# Patient Record
Sex: MALE | Race: BLACK OR AFRICAN AMERICAN | NOT HISPANIC OR LATINO | URBAN - METROPOLITAN AREA
[De-identification: names, ages, dates, MRNs, and addresses within clinical notes are randomized per-mention and may not be internally consistent; named-entity substitution may affect disease eponyms.]

---

## 2017-09-04 ENCOUNTER — INPATIENT (INPATIENT)
Facility: HOSPITAL | Age: 82
LOS: 1 days | Discharge: HOME | End: 2017-09-06
Attending: HOSPITALIST | Admitting: HOSPITALIST
Payer: MEDICARE

## 2017-09-04 VITALS
TEMPERATURE: 98 F | HEART RATE: 71 BPM | DIASTOLIC BLOOD PRESSURE: 77 MMHG | OXYGEN SATURATION: 100 % | SYSTOLIC BLOOD PRESSURE: 161 MMHG | RESPIRATION RATE: 17 BRPM

## 2017-09-04 DIAGNOSIS — Z29.9 ENCOUNTER FOR PROPHYLACTIC MEASURES, UNSPECIFIED: ICD-10-CM

## 2017-09-04 DIAGNOSIS — C79.9 SECONDARY MALIGNANT NEOPLASM OF UNSPECIFIED SITE: ICD-10-CM

## 2017-09-04 DIAGNOSIS — Z90.5 ACQUIRED ABSENCE OF KIDNEY: Chronic | ICD-10-CM

## 2017-09-04 DIAGNOSIS — Q24.5 MALFORMATION OF CORONARY VESSELS: ICD-10-CM

## 2017-09-04 DIAGNOSIS — E11.9 TYPE 2 DIABETES MELLITUS WITHOUT COMPLICATIONS: ICD-10-CM

## 2017-09-04 DIAGNOSIS — I82.409 ACUTE EMBOLISM AND THROMBOSIS OF UNSPECIFIED DEEP VEINS OF UNSPECIFIED LOWER EXTREMITY: ICD-10-CM

## 2017-09-04 DIAGNOSIS — G20 PARKINSON'S DISEASE: ICD-10-CM

## 2017-09-04 DIAGNOSIS — N40.0 BENIGN PROSTATIC HYPERPLASIA WITHOUT LOWER URINARY TRACT SYMPTOMS: ICD-10-CM

## 2017-09-04 DIAGNOSIS — D69.3 IMMUNE THROMBOCYTOPENIC PURPURA: ICD-10-CM

## 2017-09-04 DIAGNOSIS — Z98.890 OTHER SPECIFIED POSTPROCEDURAL STATES: Chronic | ICD-10-CM

## 2017-09-04 DIAGNOSIS — I10 ESSENTIAL (PRIMARY) HYPERTENSION: ICD-10-CM

## 2017-09-04 LAB
ALBUMIN SERPL ELPH-MCNC: 3.9 G/DL — SIGNIFICANT CHANGE UP (ref 3.3–5)
ALP SERPL-CCNC: 100 U/L — SIGNIFICANT CHANGE UP (ref 40–120)
ALT FLD-CCNC: 5 U/L — SIGNIFICANT CHANGE UP (ref 4–41)
AMORPH CRY # UR COMP ASSIST: SIGNIFICANT CHANGE UP (ref 0–0)
APPEARANCE UR: SIGNIFICANT CHANGE UP
AST SERPL-CCNC: 17 U/L — SIGNIFICANT CHANGE UP (ref 4–40)
BACTERIA # UR AUTO: SIGNIFICANT CHANGE UP
BASE EXCESS BLDV CALC-SCNC: 2.4 MMOL/L — SIGNIFICANT CHANGE UP
BASOPHILS # BLD AUTO: 0.05 K/UL — SIGNIFICANT CHANGE UP (ref 0–0.2)
BASOPHILS NFR BLD AUTO: 0.5 % — SIGNIFICANT CHANGE UP (ref 0–2)
BILIRUB SERPL-MCNC: 0.2 MG/DL — SIGNIFICANT CHANGE UP (ref 0.2–1.2)
BILIRUB UR-MCNC: NEGATIVE — SIGNIFICANT CHANGE UP
BLOOD GAS VENOUS - CREATININE: 0.72 MG/DL — SIGNIFICANT CHANGE UP (ref 0.5–1.3)
BLOOD UR QL VISUAL: NEGATIVE — SIGNIFICANT CHANGE UP
BUN SERPL-MCNC: 12 MG/DL — SIGNIFICANT CHANGE UP (ref 7–23)
CALCIUM SERPL-MCNC: 9.7 MG/DL — SIGNIFICANT CHANGE UP (ref 8.4–10.5)
CHLORIDE BLDV-SCNC: 99 MMOL/L — SIGNIFICANT CHANGE UP (ref 96–108)
CHLORIDE SERPL-SCNC: 98 MMOL/L — SIGNIFICANT CHANGE UP (ref 98–107)
CO2 SERPL-SCNC: 23 MMOL/L — SIGNIFICANT CHANGE UP (ref 22–31)
COLOR SPEC: SIGNIFICANT CHANGE UP
CREAT SERPL-MCNC: 0.87 MG/DL — SIGNIFICANT CHANGE UP (ref 0.5–1.3)
EOSINOPHIL # BLD AUTO: 0.02 K/UL — SIGNIFICANT CHANGE UP (ref 0–0.5)
EOSINOPHIL NFR BLD AUTO: 0.2 % — SIGNIFICANT CHANGE UP (ref 0–6)
GAS PNL BLDV: 132 MMOL/L — LOW (ref 136–146)
GLUCOSE BLDV-MCNC: 191 — HIGH (ref 70–99)
GLUCOSE SERPL-MCNC: 191 MG/DL — HIGH (ref 70–99)
GLUCOSE UR-MCNC: NEGATIVE — SIGNIFICANT CHANGE UP
HCO3 BLDV-SCNC: 25 MMOL/L — SIGNIFICANT CHANGE UP (ref 20–27)
HCT VFR BLD CALC: 34.7 % — LOW (ref 39–50)
HCT VFR BLDV CALC: 38.3 % — LOW (ref 39–51)
HGB BLD-MCNC: 12.1 G/DL — LOW (ref 13–17)
HGB BLDV-MCNC: 12.5 G/DL — LOW (ref 13–17)
HYALINE CASTS # UR AUTO: SIGNIFICANT CHANGE UP (ref 0–?)
IMM GRANULOCYTES # BLD AUTO: 0.04 # — SIGNIFICANT CHANGE UP
IMM GRANULOCYTES NFR BLD AUTO: 0.4 % — SIGNIFICANT CHANGE UP (ref 0–1.5)
KETONES UR-MCNC: NEGATIVE — SIGNIFICANT CHANGE UP
LACTATE BLDV-MCNC: 2.7 MMOL/L — HIGH (ref 0.5–2)
LEUKOCYTE ESTERASE UR-ACNC: HIGH
LIDOCAIN IGE QN: 13 U/L — SIGNIFICANT CHANGE UP (ref 7–60)
LYMPHOCYTES # BLD AUTO: 1.12 K/UL — SIGNIFICANT CHANGE UP (ref 1–3.3)
LYMPHOCYTES # BLD AUTO: 11.6 % — LOW (ref 13–44)
MCHC RBC-ENTMCNC: 31.6 PG — SIGNIFICANT CHANGE UP (ref 27–34)
MCHC RBC-ENTMCNC: 34.9 % — SIGNIFICANT CHANGE UP (ref 32–36)
MCV RBC AUTO: 90.6 FL — SIGNIFICANT CHANGE UP (ref 80–100)
MONOCYTES # BLD AUTO: 0.53 K/UL — SIGNIFICANT CHANGE UP (ref 0–0.9)
MONOCYTES NFR BLD AUTO: 5.5 % — SIGNIFICANT CHANGE UP (ref 2–14)
MUCOUS THREADS # UR AUTO: SIGNIFICANT CHANGE UP
NEUTROPHILS # BLD AUTO: 7.89 K/UL — HIGH (ref 1.8–7.4)
NEUTROPHILS NFR BLD AUTO: 81.8 % — HIGH (ref 43–77)
NITRITE UR-MCNC: NEGATIVE — SIGNIFICANT CHANGE UP
NRBC # FLD: 0 — SIGNIFICANT CHANGE UP
PCO2 BLDV: 55 MMHG — HIGH (ref 41–51)
PH BLDV: 7.32 PH — SIGNIFICANT CHANGE UP (ref 7.32–7.43)
PH UR: 8 — SIGNIFICANT CHANGE UP (ref 4.6–8)
PLATELET # BLD AUTO: 102 K/UL — LOW (ref 150–400)
PMV BLD: 14.6 FL — HIGH (ref 7–13)
PO2 BLDV: 26 MMHG — LOW (ref 35–40)
POTASSIUM BLDV-SCNC: 7.5 MMOL/L — CRITICAL HIGH (ref 3.4–4.5)
POTASSIUM SERPL-MCNC: 4.4 MMOL/L — SIGNIFICANT CHANGE UP (ref 3.5–5.3)
POTASSIUM SERPL-SCNC: 4.4 MMOL/L — SIGNIFICANT CHANGE UP (ref 3.5–5.3)
PROT SERPL-MCNC: 7.4 G/DL — SIGNIFICANT CHANGE UP (ref 6–8.3)
PROT UR-MCNC: 20 — SIGNIFICANT CHANGE UP
RBC # BLD: 3.83 M/UL — LOW (ref 4.2–5.8)
RBC # FLD: 13.9 % — SIGNIFICANT CHANGE UP (ref 10.3–14.5)
RBC CASTS # UR COMP ASSIST: HIGH (ref 0–?)
SAO2 % BLDV: 41.1 % — LOW (ref 60–85)
SODIUM SERPL-SCNC: 137 MMOL/L — SIGNIFICANT CHANGE UP (ref 135–145)
SP GR SPEC: 1.02 — SIGNIFICANT CHANGE UP (ref 1–1.03)
SQUAMOUS # UR AUTO: SIGNIFICANT CHANGE UP
UROBILINOGEN FLD QL: NORMAL E.U. — SIGNIFICANT CHANGE UP (ref 0.1–0.2)
WBC # BLD: 9.65 K/UL — SIGNIFICANT CHANGE UP (ref 3.8–10.5)
WBC # FLD AUTO: 9.65 K/UL — SIGNIFICANT CHANGE UP (ref 3.8–10.5)
WBC CLUMPS #/AREA URNS HPF: PRESENT — HIGH (ref 0–?)
WBC UR QL: SIGNIFICANT CHANGE UP (ref 0–?)

## 2017-09-04 PROCEDURE — 74177 CT ABD & PELVIS W/CONTRAST: CPT | Mod: 26

## 2017-09-04 PROCEDURE — 99223 1ST HOSP IP/OBS HIGH 75: CPT

## 2017-09-04 PROCEDURE — 70450 CT HEAD/BRAIN W/O DYE: CPT | Mod: 26

## 2017-09-04 RX ORDER — CARBIDOPA AND LEVODOPA 25; 100 MG/1; MG/1
2 TABLET ORAL
Qty: 0 | Refills: 0 | COMMUNITY

## 2017-09-04 RX ORDER — DEXTROSE 50 % IN WATER 50 %
1 SYRINGE (ML) INTRAVENOUS ONCE
Qty: 0 | Refills: 0 | Status: DISCONTINUED | OUTPATIENT
Start: 2017-09-04 | End: 2017-09-06

## 2017-09-04 RX ORDER — INSULIN LISPRO 100/ML
VIAL (ML) SUBCUTANEOUS
Qty: 0 | Refills: 0 | Status: DISCONTINUED | OUTPATIENT
Start: 2017-09-04 | End: 2017-09-06

## 2017-09-04 RX ORDER — DEXTROSE 50 % IN WATER 50 %
25 SYRINGE (ML) INTRAVENOUS ONCE
Qty: 0 | Refills: 0 | Status: DISCONTINUED | OUTPATIENT
Start: 2017-09-04 | End: 2017-09-06

## 2017-09-04 RX ORDER — AMLODIPINE BESYLATE 2.5 MG/1
5 TABLET ORAL DAILY
Qty: 0 | Refills: 0 | Status: DISCONTINUED | OUTPATIENT
Start: 2017-09-04 | End: 2017-09-06

## 2017-09-04 RX ORDER — TAMSULOSIN HYDROCHLORIDE 0.4 MG/1
0.4 CAPSULE ORAL AT BEDTIME
Qty: 0 | Refills: 0 | Status: DISCONTINUED | OUTPATIENT
Start: 2017-09-04 | End: 2017-09-06

## 2017-09-04 RX ORDER — ACETAMINOPHEN 500 MG
650 TABLET ORAL ONCE
Qty: 0 | Refills: 0 | Status: COMPLETED | OUTPATIENT
Start: 2017-09-04 | End: 2017-09-04

## 2017-09-04 RX ORDER — MEGESTROL ACETATE 40 MG/ML
5 SUSPENSION ORAL
Qty: 0 | Refills: 0 | COMMUNITY

## 2017-09-04 RX ORDER — DEXTROSE 50 % IN WATER 50 %
12.5 SYRINGE (ML) INTRAVENOUS ONCE
Qty: 0 | Refills: 0 | Status: DISCONTINUED | OUTPATIENT
Start: 2017-09-04 | End: 2017-09-06

## 2017-09-04 RX ORDER — AMLODIPINE BESYLATE 2.5 MG/1
1 TABLET ORAL
Qty: 0 | Refills: 0 | COMMUNITY

## 2017-09-04 RX ORDER — HYDRALAZINE HCL 50 MG
25 TABLET ORAL THREE TIMES A DAY
Qty: 0 | Refills: 0 | Status: DISCONTINUED | OUTPATIENT
Start: 2017-09-04 | End: 2017-09-06

## 2017-09-04 RX ORDER — INSULIN LISPRO 100/ML
VIAL (ML) SUBCUTANEOUS AT BEDTIME
Qty: 0 | Refills: 0 | Status: DISCONTINUED | OUTPATIENT
Start: 2017-09-04 | End: 2017-09-06

## 2017-09-04 RX ORDER — ASPIRIN/CALCIUM CARB/MAGNESIUM 324 MG
81 TABLET ORAL DAILY
Qty: 0 | Refills: 0 | Status: DISCONTINUED | OUTPATIENT
Start: 2017-09-04 | End: 2017-09-06

## 2017-09-04 RX ORDER — CARBIDOPA AND LEVODOPA 25; 100 MG/1; MG/1
2 TABLET ORAL THREE TIMES A DAY
Qty: 0 | Refills: 0 | Status: DISCONTINUED | OUTPATIENT
Start: 2017-09-04 | End: 2017-09-06

## 2017-09-04 RX ORDER — SIMVASTATIN 20 MG/1
20 TABLET, FILM COATED ORAL AT BEDTIME
Qty: 0 | Refills: 0 | Status: DISCONTINUED | OUTPATIENT
Start: 2017-09-04 | End: 2017-09-06

## 2017-09-04 RX ORDER — SODIUM CHLORIDE 9 MG/ML
1000 INJECTION, SOLUTION INTRAVENOUS
Qty: 0 | Refills: 0 | Status: DISCONTINUED | OUTPATIENT
Start: 2017-09-04 | End: 2017-09-06

## 2017-09-04 RX ORDER — ACETAMINOPHEN 500 MG
2 TABLET ORAL
Qty: 0 | Refills: 0 | COMMUNITY

## 2017-09-04 RX ORDER — HYDRALAZINE HCL 50 MG
1 TABLET ORAL
Qty: 0 | Refills: 0 | COMMUNITY

## 2017-09-04 RX ORDER — ENOXAPARIN SODIUM 100 MG/ML
40 INJECTION SUBCUTANEOUS EVERY 24 HOURS
Qty: 0 | Refills: 0 | Status: DISCONTINUED | OUTPATIENT
Start: 2017-09-04 | End: 2017-09-06

## 2017-09-04 RX ORDER — PRIMIDONE 250 MG/1
250 TABLET ORAL
Qty: 0 | Refills: 0 | Status: DISCONTINUED | OUTPATIENT
Start: 2017-09-04 | End: 2017-09-05

## 2017-09-04 RX ORDER — TAMSULOSIN HYDROCHLORIDE 0.4 MG/1
1 CAPSULE ORAL
Qty: 0 | Refills: 0 | COMMUNITY

## 2017-09-04 RX ORDER — SODIUM CHLORIDE 9 MG/ML
1000 INJECTION INTRAMUSCULAR; INTRAVENOUS; SUBCUTANEOUS ONCE
Qty: 0 | Refills: 0 | Status: COMPLETED | OUTPATIENT
Start: 2017-09-04 | End: 2017-09-04

## 2017-09-04 RX ORDER — CARBIDOPA AND LEVODOPA 25; 100 MG/1; MG/1
1 TABLET ORAL DAILY
Qty: 0 | Refills: 0 | Status: DISCONTINUED | OUTPATIENT
Start: 2017-09-04 | End: 2017-09-04

## 2017-09-04 RX ORDER — GLUCAGON INJECTION, SOLUTION 0.5 MG/.1ML
1 INJECTION, SOLUTION SUBCUTANEOUS ONCE
Qty: 0 | Refills: 0 | Status: DISCONTINUED | OUTPATIENT
Start: 2017-09-04 | End: 2017-09-06

## 2017-09-04 RX ORDER — CEFTRIAXONE 500 MG/1
1 INJECTION, POWDER, FOR SOLUTION INTRAMUSCULAR; INTRAVENOUS ONCE
Qty: 0 | Refills: 0 | Status: COMPLETED | OUTPATIENT
Start: 2017-09-04 | End: 2017-09-04

## 2017-09-04 RX ORDER — SIMVASTATIN 20 MG/1
1 TABLET, FILM COATED ORAL
Qty: 0 | Refills: 0 | COMMUNITY

## 2017-09-04 RX ADMIN — Medication 25 MILLIGRAM(S): at 22:43

## 2017-09-04 RX ADMIN — Medication 650 MILLIGRAM(S): at 10:45

## 2017-09-04 RX ADMIN — ENOXAPARIN SODIUM 40 MILLIGRAM(S): 100 INJECTION SUBCUTANEOUS at 22:46

## 2017-09-04 RX ADMIN — CEFTRIAXONE 100 GRAM(S): 500 INJECTION, POWDER, FOR SOLUTION INTRAMUSCULAR; INTRAVENOUS at 14:22

## 2017-09-04 RX ADMIN — TAMSULOSIN HYDROCHLORIDE 0.4 MILLIGRAM(S): 0.4 CAPSULE ORAL at 22:43

## 2017-09-04 RX ADMIN — Medication 650 MILLIGRAM(S): at 09:15

## 2017-09-04 RX ADMIN — SODIUM CHLORIDE 1000 MILLILITER(S): 9 INJECTION INTRAMUSCULAR; INTRAVENOUS; SUBCUTANEOUS at 09:15

## 2017-09-04 RX ADMIN — SIMVASTATIN 20 MILLIGRAM(S): 20 TABLET, FILM COATED ORAL at 22:43

## 2017-09-04 RX ADMIN — CARBIDOPA AND LEVODOPA 2 TABLET(S): 25; 100 TABLET ORAL at 22:43

## 2017-09-04 NOTE — ED ADULT TRIAGE NOTE - CHIEF COMPLAINT QUOTE
Pt arrives to ED via EMs c/o lower right quadrant pain radiating to his back.  Pt has kidney cancer.  Pt unable to say when last chemo treatment was.  Spouse to arrive with more information. Pt arrives to ED via EMS c/o lower right quadrant pain radiating to his back.  Pain present for last 4 days.  Pt has kidney cancer.  Pt unable to say when last chemo treatment was.  Spouse to arrive with more information.

## 2017-09-04 NOTE — ED ADULT NURSE REASSESSMENT NOTE - NS ED NURSE REASSESS COMMENT FT1
pt AO x3, ambulatory, received from ALEJANDRA Luis, denies any pain or discomfort at this time. Cardiac monitor in place/ sinus. Will continue to monitor.

## 2017-09-04 NOTE — H&P ADULT - PROBLEM SELECTOR PLAN 1
- Incidental finding of coronary sinus thrombosis; as per oncologist, he was not aware of any recent findings of thrombus on imaging.    - Given history of DVT/PE and active malignancy, patient is at high VTE risk.  Thrombus is likely also a consequence of having catheterization/embolectomy/manipulation of R heart in past.  Would start anticoagulation at this time with heparin gtt and further discuss with inpatient hematology/vascular cardiology in AM, especially given history of ITP.    - Risks/benefits of anticoagulation were discussed extensively with pt and family and they opt to pursue anticoagulation despite ITP and bleed risk.   - CT head negative for hemorrhage/metastatic disease - Incidental finding of coronary sinus thrombosis; as per oncologist, he was not aware of any recent findings of thrombus on imaging.    - Given history of DVT/PE and active malignancy, patient is at high VTE risk.  Thrombus is likely also a consequence of having catheterization/embolectomy/manipulation of R heart in past.  Will hold anticoagulation at this time and further discuss with inpatient hematology/vascular cardiology in AM, especially given history of ITP.    - Risks/benefits of anticoagulation were discussed extensively with pt and family and they would be willing to pursue anticoagulation despite bleed risk.   - CT head negative for hemorrhage/metastatic disease - Incidental finding of coronary sinus thrombosis; as per oncologist, he was not aware of any recent findings of thrombus on imaging.    - Given history of DVT/PE and active malignancy, patient is at high VTE risk.  Thrombus is likely also a consequence of having catheterization/embolectomy/manipulation of R heart in past.  Will hold anticoagulation at this time and further discuss with inpatient hematology/vascular cardiology in AM, especially given history of ITP.  Pt is currently hemodynamically stable, not hypoxic, no tachycardia, no signs/symptoms to suggest acute PE.   - Risks/benefits of anticoagulation were discussed extensively with pt and family and they would be willing to pursue anticoagulation despite bleed risk.   - CT head negative for hemorrhage/metastatic disease

## 2017-09-04 NOTE — ED ADULT NURSE NOTE - OBJECTIVE STATEMENT
Received patient to room25. Pt is very weak and lethargic. Pt did come in with c/o abdominal pain. Pt has family at bedside and is alert and oriented times three. Pt evaluated by MD. IVL placed to right hand 20 gauge and labs drawn and sent. Tylenol given as ordered and NS infusing. Pt waiting for results, further evaluation, testing and disposition.      ALEJANDRA Mcgrath

## 2017-09-04 NOTE — ED ADULT NURSE NOTE - CHIEF COMPLAINT QUOTE
Pt arrives to ED via EMS c/o lower right quadrant pain radiating to his back.  Pain present for last 4 days.  Pt has kidney cancer.  Pt unable to say when last chemo treatment was.  Spouse to arrive with more information.

## 2017-09-04 NOTE — H&P ADULT - NSHPPHYSICALEXAM_GEN_ALL_CORE
GENERAL: NAD, well-developed  HEAD:  Atraumatic, Normocephalic  EYES: EOMI, PERRLA, conjunctiva and sclera clear  NECK: Supple, No JVD  CHEST/LUNG: Clear to auscultation bilaterally; No wheeze  HEART: Regular rate and rhythm; No murmurs, rubs, or gallops  ABDOMEN: Soft, Nontender, Nondistended; Bowel sounds present  EXTREMITIES:  2+ Peripheral Pulses, No clubbing, cyanosis, or edema  PSYCH: AAOx3  NEUROLOGY: non-focal  SKIN: No rashes or lesions

## 2017-09-04 NOTE — ED PROVIDER NOTE - PROGRESS NOTE DETAILS
Gollogly: Spoke with Dr Bro - pt has RCC with lymph node mets, has been on Votrient x 2 months. Also has hx of ITP. Gollogly: Called for potassium 7.5 on VBG. Cr 0.72. No peaked T waves and narrow QRS on EKG. Likely hemolyzed, no treatment for now, will follow up CMP. Gollogly: Discussed lab/radiology findings and plan to admit. Pt understands and agrees with plan. Pt with hx of blood clots, currently not on AC. Will get CTH prior to starting AC to eval for mets. Paged Dr Swain at [phone] for admission. Pololy: Discussed lab/radiology findings and plan to admit. Pt understands and agrees with plan. Pt with hx of blood clots, currently not on AC. Will get CTH prior to starting AC to eval for mets. Paged Dr Swain at [phone] for admission. Spoke with Dr Bro again - pt not on AC in the past b/c ITP (had platelets of 10,000). Gollogly: Paged Dr Swain a second time.

## 2017-09-04 NOTE — H&P ADULT - HISTORY OF PRESENT ILLNESS
85M hx RCC s/p R nephrectomy 4 years ago, recently found to have recurrence of cancer in RP lymph nodes and started on Votrient 4 weeks ago, HTN, DM2, BPH, ITP, early Parkinson's disease, RLE DVT and PE 1 year ago s/p embolectomy at [place] (now off Coumadin) presents with RLQ pain.  Pain started 3 days ago and described as sharp pains; he received Tylenol in ER and now pain has completely subsided.  On CT A/P, the patient was incidentally found to have a coronary sinus thrombosis for which the patient is being admitted for.  Pt is a poor historian and family was unable to provide much detail regarding his clot history.  The patient did have a procedure at [place] 1 year ago to remove the clot, which was later confirmed with his outpt oncologist to be an embolectomy.  He is also s/p IVF filter placement and oncologist was unaware of a coronary sinus thrombus on recent imaging.  Pt was taken off of Coumadin by oncologist 2/2 ITP.  Currently, the patient denies SOB, chest pain, LE swelling, dizziness or any recent fainting episodes.  He describes feeling a "funny feeling" in his L upper chest 2 weeks ago, which went away on its own.  Denies any bruising or bleeding episodes, no epistaxis/gum bleeding, melena/hematochezia. 85M hx RCC s/p R nephrectomy 4 years ago, recently found to have recurrence of cancer in RP lymph nodes and started on Votrient 4 weeks ago, HTN, DM2, BPH, ITP, early Parkinson's disease, RLE DVT and PE 1 year ago s/p embolectomy at [place] (now off Coumadin) presents with RLQ pain.  Pain started 3 days ago and described as sharp pains; he received Tylenol in ER and now pain has completely subsided.  On CT A/P, the patient was incidentally found to have a coronary sinus thrombosis for which the patient is being admitted for.  Pt is a poor historian and family was unable to provide much detail regarding his clot history.  The patient did have a procedure at [place] 1 year ago to remove the clot, which was later confirmed with his outpt oncologist to be an embolectomy.  He is also s/p IVF filter placement and oncologist was unaware of a coronary sinus thrombus on recent imaging.  Pt was taken off of Coumadin by oncologist 2/2 ITP several months ago.  Currently, the patient denies SOB, chest pain, LE swelling, dizziness or any recent fainting episodes.  He describes feeling a "funny feeling" in his L upper chest 2 weeks ago, which went away on its own.  Denies any bruising or bleeding episodes, no epistaxis/gum bleeding, melena/hematochezia.

## 2017-09-04 NOTE — H&P ADULT - ASSESSMENT
85M hx RCC s/p R nephrectomy 4 years ago, recently found to have recurrence of cancer in RP lymph nodes and started on Votrient 4 weeks ago, HTN, DM2, BPH, ITP on Promacta (as per oncologist, but pt did not provide this on med list), early Parkinson's disease, RLE DVT and PE 1 year ago s/p embolectomy at [place] (now off Coumadin) presents with RLQ pain.  RLQ pain likely referred pain from recurrence of R retroperitoneal mass, which has now resolved, but incidentally found to have a coronary sinus thrombosis of unclear acuity. 85M hx RCC s/p R nephrectomy 4 years ago, recently found to have recurrence of cancer in RP lymph nodes and started on Votrient 4 weeks ago, HTN, DM2, BPH, ITP on Promacta (as per oncologist, but pt did not provide this on med list), early Parkinson's disease, RLE DVT and PE 1 year ago s/p embolectomy at [place] (now off Coumadin) presents with RLQ pain.  RLQ pain likely referred pain from recurrence of R retroperitoneal mass, which has now resolved, but incidentally found to have a coronary sinus thrombosis of unknown acuity/chronicity.

## 2017-09-04 NOTE — H&P ADULT - PMH
BPH (benign prostatic hyperplasia)    DM (diabetes mellitus)    DVT (deep venous thrombosis)    HTN (hypertension)    Other pulmonary embolism without acute cor pulmonale, unspecified chronicity    Parkinson disease    Renal carcinoma

## 2017-09-04 NOTE — ED PROVIDER NOTE - ATTENDING CONTRIBUTION TO CARE
MASSIMO Attending Note - Dr. Metzger  85M h/o renal cancer s/p R nephrectomy on chemo (Votrient 200mg, last dose yesterday), hx HTN, DM, BPH, Parkinson's, p/w gradually worsening  RLQ constant aching pain x 4 days with nausea and vomiting.  PE: pt is alert and oriented, perrl, ent normal, membranes are dry, neck supple. no lymphadenopathy or thyroid enlargement, No JVD.  Chest clear to P&A, Heart- reg rhythm without murmur, rubs or gallops, radial pulses equal bilaterally.  Abd is soft, right lower quadrant-tenderness in the pelvic area., Bowel sounds are active. no mass or organomegaly. : No CVA tenderness. Neuro:  Pt alert and oriented x 3. Perrl    Distal neurosensory is intact. Motor function is 5/5 strength bilaterally.  No focal deficits. Extremities:  No edema.  Skin: warm and dry.  Impression:  Abdominal pain  Plan: labs, CT scan discussion with Dr. Bro.

## 2017-09-04 NOTE — H&P ADULT - NSHPREVIEWOFSYSTEMS_GEN_ALL_CORE
CONSTITUTIONAL: No fever, weight loss, or fatigue  EYES: No eye pain, visual disturbances, or discharge  ENMT:  No difficulty hearing, tinnitus, vertigo; No sinus or throat pain  NECK: No pain or stiffness  RESPIRATORY: No cough, wheezing, chills or hemoptysis; No shortness of breath  CARDIOVASCULAR: No chest pain, palpitations, dizziness, or leg swelling  GASTROINTESTINAL: Had RLQ pain, now resolved. No nausea, vomiting, or hematemesis; +constipation. No melena or hematochezia.  GENITOURINARY: No dysuria, frequency, hematuria, or incontinence  NEUROLOGICAL: No headaches, memory loss, loss of strength, numbness, or tremors  SKIN: No itching, burning, rashes, or lesions   MUSCULOSKELETAL: No joint pain or swelling  PSYCHIATRIC: No depression, anxiety, mood swings, or difficulty sleeping  HEME/LYMPH: No easy bruising, or bleeding gums

## 2017-09-04 NOTE — ED PROVIDER NOTE - OBJECTIVE STATEMENT
85M h/o renal cancer s/p R nephrectomy on chemo (Votrient 200mg, last dose yesterday), hx HTN, DM, BPH, Parkinson's, p/w RLQ pain x 4 days. The pain is waxing/waning, radiates straight through to the back, associated with N/V x3-4 and generalized weakness. No fever, CP, SOB, diarrhea, dysuria, hematuria, or testicular pain. Pt admitted at [facility] in July, had retroperitoneal lymph node bx.  Onc: Adrienne Bro [phone] (cell: [phone]), PMD: Luigi Swain [phone]

## 2017-09-04 NOTE — H&P ADULT - PROBLEM SELECTOR PLAN 2
- Pt did not report medications for his ITP, but oncologist states that he was prescribed Promacta 75mg in past  - Will consult inpatient hematology

## 2017-09-04 NOTE — H&P ADULT - PROBLEM SELECTOR PLAN 3
- Continue with Votrient  - F/U with outpatient oncology for mgmt of metastatic RCC  - Pain control with Tylenol PRN

## 2017-09-04 NOTE — ED PROVIDER NOTE - PMH
BPH (benign prostatic hyperplasia)    DM (diabetes mellitus)    HTN (hypertension)    Parkinson disease    Renal carcinoma

## 2017-09-04 NOTE — H&P ADULT - NSHPLABSRESULTS_GEN_ALL_CORE
LABS:                        12.1   9.65  )-----------( 102      ( 04 Sep 2017 08:30 )             34.7         137  |  98  |  12  ----------------------------<  191<H>  4.4   |  23  |  0.87    Ca    9.7      04 Sep 2017 08:30    TPro  7.4  /  Alb  3.9  /  TBili  0.2  /  DBili  x   /  AST  17  /  ALT  5   /  AlkPhos  100  09-04          Urinalysis Basic - ( 04 Sep 2017 12:00 )    Color: COLORL / Appearance: HAZY / S.019 / pH: 8.0  Gluc: NEGATIVE / Ketone: NEGATIVE  / Bili: NEGATIVE / Urobili: NORMAL E.U.   Blood: NEGATIVE / Protein: 20 / Nitrite: NEGATIVE   Leuk Esterase: LARGE / RBC: 5-10 / WBC 25-50   Sq Epi: OCC / Non Sq Epi: x / Bacteria: MOD        RADIOLOGY & ADDITIONAL TESTS:    Imaging Personally Reviewed:  < from: CT Head No Cont (17 @ 16:49) >    IMPRESSION:  No CT evidence for acute territorial infarct, intracranial hemorrhage or   mass effect. Chronic microvascular ischemic changes.      < end of copied text >    < from: CT Abdomen and Pelvis w/ IV Cont (17 @ 10:37) >    LOWER CHEST: Bibasilar subsegmental atelectasis.Calcified pleural plaque   along the anterior left chest wall. Thrombus noted in the coronary sinus.    LIVER: Within normal limits.  BILE DUCTS: Normal caliber.  GALLBLADDER: Within normal limits.  SPLEEN: Within normal limits.  PANCREAS: Within normal limits.  ADRENALS: Nodular thickening of the bilateral adrenal glands, unchanged   since 10/10/2008.  KIDNEYS/URETERS: Status post right nephrectomy. The left kidney is   uniformly enhancing. No hydronephrosis or nephrolithiasis.    BLADDER: Mild bladder wall thickening.  REPRODUCTIVE ORGANS: The prostate is enlarged, measuring 6.5 cm in   transverse dimension.    BOWEL: No bowel obstruction. Appendix is not visualized. No secondary   signs of appendicitis.   PERITONEUM: No ascites.  VESSELS:A suprarenal IVC filter is in place. Atherosclerotic   calcifications. The portal and bilateral renal veins are patent.  RETROPERITONEUM: There is a heterogeneously enhancing right   retroperitoneal mass, measuring 6.3 x 3.5 cm. The mass abuts the right   side of the aorta, causes anterior displacement of the left renal vein,   and causes lateral displacement and luminal narrowing of the IVC. Mass is   inseparable from the right karen of the diaphragm.  ABDOMINAL WALL: Postsurgical changes of the anterior abdominal wall.  BONES: Multilevel lumbar spondylosis.    IMPRESSION:     Heterogeneously enhancing right retroperitoneal mass abutting and   displacing the left renal vein and IVC without evidence of invasion or   tumor thrombus. Findings are consistent with recurrent neoplasm.    Thrombus in the coronary sinus.    < end of copied text >      Care Discussed with Consultants/Other Providers: Outpatient oncologist

## 2017-09-05 LAB
APTT BLD: 32.3 SEC — SIGNIFICANT CHANGE UP (ref 27.5–37.4)
BASOPHILS # BLD AUTO: 0.07 K/UL — SIGNIFICANT CHANGE UP (ref 0–0.2)
BASOPHILS NFR BLD AUTO: 0.8 % — SIGNIFICANT CHANGE UP (ref 0–2)
BUN SERPL-MCNC: 13 MG/DL — SIGNIFICANT CHANGE UP (ref 7–23)
CALCIUM SERPL-MCNC: 9.1 MG/DL — SIGNIFICANT CHANGE UP (ref 8.4–10.5)
CHLORIDE SERPL-SCNC: 101 MMOL/L — SIGNIFICANT CHANGE UP (ref 98–107)
CO2 SERPL-SCNC: 24 MMOL/L — SIGNIFICANT CHANGE UP (ref 22–31)
CREAT SERPL-MCNC: 0.99 MG/DL — SIGNIFICANT CHANGE UP (ref 0.5–1.3)
EOSINOPHIL # BLD AUTO: 0.04 K/UL — SIGNIFICANT CHANGE UP (ref 0–0.5)
EOSINOPHIL NFR BLD AUTO: 0.4 % — SIGNIFICANT CHANGE UP (ref 0–6)
GLUCOSE SERPL-MCNC: 117 MG/DL — HIGH (ref 70–99)
HBA1C BLD-MCNC: 7.1 % — HIGH (ref 4–5.6)
HCT VFR BLD CALC: 32.4 % — LOW (ref 39–50)
HGB BLD-MCNC: 11.6 G/DL — LOW (ref 13–17)
IMM GRANULOCYTES # BLD AUTO: 0.01 # — SIGNIFICANT CHANGE UP
IMM GRANULOCYTES NFR BLD AUTO: 0.1 % — SIGNIFICANT CHANGE UP (ref 0–1.5)
INR BLD: 1.1 — SIGNIFICANT CHANGE UP (ref 0.88–1.17)
LYMPHOCYTES # BLD AUTO: 1.38 K/UL — SIGNIFICANT CHANGE UP (ref 1–3.3)
LYMPHOCYTES # BLD AUTO: 15 % — SIGNIFICANT CHANGE UP (ref 13–44)
MANUAL SMEAR VERIFICATION: SIGNIFICANT CHANGE UP
MCHC RBC-ENTMCNC: 32.1 PG — SIGNIFICANT CHANGE UP (ref 27–34)
MCHC RBC-ENTMCNC: 35.8 % — SIGNIFICANT CHANGE UP (ref 32–36)
MCV RBC AUTO: 89.8 FL — SIGNIFICANT CHANGE UP (ref 80–100)
MONOCYTES # BLD AUTO: 0.75 K/UL — SIGNIFICANT CHANGE UP (ref 0–0.9)
MONOCYTES NFR BLD AUTO: 8.1 % — SIGNIFICANT CHANGE UP (ref 2–14)
MORPHOLOGY BLD-IMP: NORMAL — SIGNIFICANT CHANGE UP
NEUTROPHILS # BLD AUTO: 6.98 K/UL — SIGNIFICANT CHANGE UP (ref 1.8–7.4)
NEUTROPHILS NFR BLD AUTO: 75.6 % — SIGNIFICANT CHANGE UP (ref 43–77)
NRBC # FLD: 0 — SIGNIFICANT CHANGE UP
PLATELET # BLD AUTO: 49 K/UL — LOW (ref 150–400)
PLATELET COUNT - ESTIMATE: SIGNIFICANT CHANGE UP
PMV BLD: SIGNIFICANT CHANGE UP FL (ref 7–13)
POTASSIUM SERPL-MCNC: 4.2 MMOL/L — SIGNIFICANT CHANGE UP (ref 3.5–5.3)
POTASSIUM SERPL-SCNC: 4.2 MMOL/L — SIGNIFICANT CHANGE UP (ref 3.5–5.3)
PROTHROM AB SERPL-ACNC: 12.4 SEC — SIGNIFICANT CHANGE UP (ref 9.8–13.1)
RBC # BLD: 3.61 M/UL — LOW (ref 4.2–5.8)
RBC # FLD: 14.1 % — SIGNIFICANT CHANGE UP (ref 10.3–14.5)
REVIEW TO FOLLOW: YES — SIGNIFICANT CHANGE UP
SODIUM SERPL-SCNC: 138 MMOL/L — SIGNIFICANT CHANGE UP (ref 135–145)
SPECIMEN SOURCE: SIGNIFICANT CHANGE UP
WBC # BLD: 9.23 K/UL — SIGNIFICANT CHANGE UP (ref 3.8–10.5)
WBC # FLD AUTO: 9.23 K/UL — SIGNIFICANT CHANGE UP (ref 3.8–10.5)

## 2017-09-05 PROCEDURE — 99223 1ST HOSP IP/OBS HIGH 75: CPT | Mod: GC

## 2017-09-05 PROCEDURE — 99233 SBSQ HOSP IP/OBS HIGH 50: CPT

## 2017-09-05 PROCEDURE — 99223 1ST HOSP IP/OBS HIGH 75: CPT

## 2017-09-05 RX ORDER — ACETAMINOPHEN 500 MG
650 TABLET ORAL EVERY 6 HOURS
Qty: 0 | Refills: 0 | Status: DISCONTINUED | OUTPATIENT
Start: 2017-09-05 | End: 2017-09-06

## 2017-09-05 RX ADMIN — SIMVASTATIN 20 MILLIGRAM(S): 20 TABLET, FILM COATED ORAL at 22:53

## 2017-09-05 RX ADMIN — PRIMIDONE 250 MILLIGRAM(S): 250 TABLET ORAL at 05:00

## 2017-09-05 RX ADMIN — CARBIDOPA AND LEVODOPA 2 TABLET(S): 25; 100 TABLET ORAL at 05:00

## 2017-09-05 RX ADMIN — Medication 650 MILLIGRAM(S): at 03:48

## 2017-09-05 RX ADMIN — Medication 25 MILLIGRAM(S): at 22:52

## 2017-09-05 RX ADMIN — Medication 2: at 12:41

## 2017-09-05 RX ADMIN — ENOXAPARIN SODIUM 40 MILLIGRAM(S): 100 INJECTION SUBCUTANEOUS at 22:53

## 2017-09-05 RX ADMIN — Medication 25 MILLIGRAM(S): at 05:00

## 2017-09-05 RX ADMIN — CARBIDOPA AND LEVODOPA 2 TABLET(S): 25; 100 TABLET ORAL at 22:53

## 2017-09-05 RX ADMIN — Medication 81 MILLIGRAM(S): at 12:43

## 2017-09-05 RX ADMIN — Medication 25 MILLIGRAM(S): at 12:43

## 2017-09-05 RX ADMIN — AMLODIPINE BESYLATE 5 MILLIGRAM(S): 2.5 TABLET ORAL at 05:00

## 2017-09-05 RX ADMIN — Medication 650 MILLIGRAM(S): at 23:22

## 2017-09-05 RX ADMIN — CARBIDOPA AND LEVODOPA 2 TABLET(S): 25; 100 TABLET ORAL at 12:42

## 2017-09-05 RX ADMIN — Medication 650 MILLIGRAM(S): at 22:52

## 2017-09-05 RX ADMIN — Medication 650 MILLIGRAM(S): at 04:48

## 2017-09-05 RX ADMIN — TAMSULOSIN HYDROCHLORIDE 0.4 MILLIGRAM(S): 0.4 CAPSULE ORAL at 22:52

## 2017-09-05 NOTE — CONSULT NOTE ADULT - ASSESSMENT
85M hx RCC s/p R nephrectomy 4 years ago, recently found to have recurrence of cancer in retoperitoneal lymph nodes and started on Votrient 4 weeks ago, HTN, DM2, BPH, ITP, early Parkinson's disease, RLE DVT and PE 1 year ago s/p embolectomy at [place] (now off Coumadin) and IVC filter p/w RLQ pain, admitted for an incidental finding of a coronary sinus thrombosis

## 2017-09-05 NOTE — PROGRESS NOTE ADULT - ASSESSMENT
85M hx RCC s/p R nephrectomy 4 years ago, recently found to have recurrence of cancer in RP lymph nodes and started on Votrient 4 weeks ago, HTN, DM2, BPH, ITP on Promacta (as per oncologist, but pt did not provide this on med list), early Parkinson's disease, RLE DVT and PE 1 year ago s/p embolectomy at [place] (now off Coumadin) presents with RLQ pain.  RLQ pain likely referred pain from recurrence of R retroperitoneal mass, which has now resolved, but incidentally found to have a coronary sinus thrombosis of unknown acuity/chronicity.

## 2017-09-05 NOTE — PROGRESS NOTE ADULT - SUBJECTIVE AND OBJECTIVE BOX
Patient is a 85y old  Male who presents with a chief complaint of RLQ pain (04 Sep 2017 18:59)      SUBJECTIVE / OVERNIGHT EVENTS: Had another episode of severe RLQ pain overnight, relieved completely with Tylenol.  Denies fevers/chills, SOB, CP, dysuria, N/V/D.        MEDICATIONS  (STANDING):  primidone 250 milliGRAM(s) Oral two times a day  simvastatin 20 milliGRAM(s) Oral at bedtime  amLODIPine   Tablet 5 milliGRAM(s) Oral daily  hydrALAZINE 25 milliGRAM(s) Oral three times a day  insulin lispro (HumaLOG) corrective regimen sliding scale   SubCutaneous three times a day before meals  insulin lispro (HumaLOG) corrective regimen sliding scale   SubCutaneous at bedtime  dextrose 5%. 1000 milliLiter(s) (50 mL/Hr) IV Continuous <Continuous>  dextrose 50% Injectable 12.5 Gram(s) IV Push once  dextrose 50% Injectable 25 Gram(s) IV Push once  dextrose 50% Injectable 25 Gram(s) IV Push once  tamsulosin 0.4 milliGRAM(s) Oral at bedtime  carbidopa/levodopa  10/100 2 Tablet(s) Oral three times a day  enoxaparin Injectable 40 milliGRAM(s) SubCutaneous every 24 hours  aspirin enteric coated 81 milliGRAM(s) Oral daily  pazopanib 200 milliGRAM(s) Oral two times a day    MEDICATIONS  (PRN):  dextrose Gel 1 Dose(s) Oral once PRN Blood Glucose LESS THAN 70 milliGRAM(s)/deciliter  glucagon  Injectable 1 milliGRAM(s) IntraMuscular once PRN Glucose LESS THAN 70 milligrams/deciliter  acetaminophen   Tablet. 650 milliGRAM(s) Oral every 6 hours PRN Mild Pain (1 - 3)      Vital Signs Last 24 Hrs  T(C): 36.3 (05 Sep 2017 12:33), Max: 37 (04 Sep 2017 20:59)  T(F): 97.4 (05 Sep 2017 12:33), Max: 98.6 (04 Sep 2017 20:59)  HR: 91 (05 Sep 2017 12:33) (62 - 91)  BP: 108/56 (05 Sep 2017 12:33) (108/56 - 182/77)  BP(mean): --  RR: 18 (05 Sep 2017 12:33) (17 - 20)  SpO2: 100% (05 Sep 2017 12:33) (97% - 181%)  CAPILLARY BLOOD GLUCOSE  238 (05 Sep 2017 12:33)  131 (05 Sep 2017 08:49)  166 (04 Sep 2017 20:59)      PHYSICAL EXAM  GENERAL: NAD, well-developed  HEAD:  Atraumatic, Normocephalic  EYES: EOMI, PERRLA, conjunctiva and sclera clear  NECK: Supple, No JVD  CHEST/LUNG: Clear to auscultation bilaterally; No wheeze  HEART: Regular rate and rhythm; No murmurs, rubs, or gallops  ABDOMEN: Soft, Nontender, Nondistended; Bowel sounds present  EXTREMITIES:  2+ Peripheral Pulses, No clubbing, cyanosis, or edema  PSYCH: AAOx3  SKIN: No rashes or lesions    LABS:                        11.6   9.23  )-----------( 49       ( 05 Sep 2017 07:00 )             32.4     09    138  |  101  |  13  ----------------------------<  117<H>  4.2   |  24  |  0.99    Ca    9.1      05 Sep 2017 07:00    TPro  7.4  /  Alb  3.9  /  TBili  0.2  /  DBili  x   /  AST  17  /  ALT  5   /  AlkPhos  100  09-04    PT/INR - ( 05 Sep 2017 07:00 )   PT: 12.4 SEC;   INR: 1.10          PTT - ( 05 Sep 2017 07:00 )  PTT:32.3 SEC      Urinalysis Basic - ( 04 Sep 2017 12:00 )    Color: COLORL / Appearance: HAZY / S.019 / pH: 8.0  Gluc: NEGATIVE / Ketone: NEGATIVE  / Bili: NEGATIVE / Urobili: NORMAL E.U.   Blood: NEGATIVE / Protein: 20 / Nitrite: NEGATIVE   Leuk Esterase: LARGE / RBC: 5-10 / WBC 25-50   Sq Epi: OCC / Non Sq Epi: x / Bacteria: MOD        RADIOLOGY & ADDITIONAL TESTS:    Imaging Personally Reviewed:  < from: CT Head No Cont (17 @ 16:49) >  IMPRESSION:  No CT evidence for acute territorial infarct, intracranial hemorrhage or   mass effect. Chronic microvascular ischemic changes.      < end of copied text >  < from: CT Abdomen and Pelvis w/ IV Cont (17 @ 10:37) >  IMPRESSION:     Heterogeneously enhancing right retroperitoneal mass abutting and   displacing the left renal vein and IVC without evidence of invasion or   tumor thrombus. Findings are consistent with recurrent neoplasm.    Thrombus in the coronary sinus.    < end of copied text >      Care Discussed with Consultants/Other Providers: Hematology, Cardiology

## 2017-09-05 NOTE — CONSULT NOTE ADULT - PROBLEM SELECTOR RECOMMENDATION 3
- History of renal cell carcinoma s/p right nephrectomy   - CT a/p showing a retroperitoneal mass abutting the left renal vein and displacing the IVC   - c/w Votrient; will call his oncologist Dr. Bro tomorrow

## 2017-09-05 NOTE — CONSULT NOTE ADULT - SUBJECTIVE AND OBJECTIVE BOX
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  Pain started 3 days ago and described as sharp pains; he received Tylenol in ER and now pain has completely subsided.  On CT A/P, the patient was incidentally found to have a coronary sinus thrombosis for which the patient is being admitted for.  Pt is a poor historian and family was unable to provide much detail regarding his clot history.  The patient did have a procedure at [place] 1 year ago to remove the clot, which was later confirmed with his outpt oncologist to be an embolectomy.  He is also s/p IVF filter placement and oncologist was unaware of a coronary sinus thrombus on recent imaging.  Pt was taken off of Coumadin by oncologist 2/2 ITP several months ago.  Currently, the patient denies SOB, chest pain, LE swelling, dizziness or any recent fainting episodes.  He describes feeling a "funny feeling" in his L upper chest 2 weeks ago, which went away on its own.  Denies any bruising or bleeding episodes, no epistaxis/gum bleeding, melena/hematochezia. (04 Sep 2017 18:59)          Allergies    No Known Allergies    Intolerances    	    MEDICATIONS:  amLODIPine   Tablet 5 milliGRAM(s) Oral daily  hydrALAZINE 25 milliGRAM(s) Oral three times a day  tamsulosin 0.4 milliGRAM(s) Oral at bedtime  enoxaparin Injectable 40 milliGRAM(s) SubCutaneous every 24 hours  aspirin enteric coated 81 milliGRAM(s) Oral daily        carbidopa/levodopa  10/100 2 Tablet(s) Oral three times a day  acetaminophen   Tablet. 650 milliGRAM(s) Oral every 6 hours PRN      simvastatin 20 milliGRAM(s) Oral at bedtime  insulin lispro (HumaLOG) corrective regimen sliding scale   SubCutaneous three times a day before meals  insulin lispro (HumaLOG) corrective regimen sliding scale   SubCutaneous at bedtime  dextrose Gel 1 Dose(s) Oral once PRN  dextrose 50% Injectable 12.5 Gram(s) IV Push once  dextrose 50% Injectable 25 Gram(s) IV Push once  dextrose 50% Injectable 25 Gram(s) IV Push once  glucagon  Injectable 1 milliGRAM(s) IntraMuscular once PRN    dextrose 5%. 1000 milliLiter(s) IV Continuous <Continuous>  pazopanib 200 milliGRAM(s) Oral two times a day      PAST MEDICAL & SURGICAL HISTORY:  Other pulmonary embolism without acute cor pulmonale, unspecified chronicity  DVT (deep venous thrombosis)  Parkinson disease  Renal carcinoma  BPH (benign prostatic hyperplasia)  DM (diabetes mellitus)  HTN (hypertension)  History of embolectomy  History of nephrectomy, unilateral      FAMILY HISTORY:  No pertinent family history in first degree relatives      SOCIAL HISTORY:    [ ] Non-smoker  [ ] Smoker  [ ] Alcohol    REVIEW OF SYSTEMS:  CONSTITUTIONAL: No fever, weight loss, or fatigue  EYES: No eye pain, visual disturbances, or discharge  ENMT:  No difficulty hearing, tinnitus, vertigo; No sinus or throat pain  NECK: No pain or stiffness  RESPIRATORY: No cough, wheezing, chills or hemoptysis; No Shortness of Breath  CARDIOVASCULAR: No chest pain, palpitations, passing out, dizziness, or leg swelling  GASTROINTESTINAL: No abdominal or epigastric pain. No nausea, vomiting, or hematemesis; No diarrhea or constipation. No melena or hematochezia.  GENITOURINARY: No dysuria, frequency, hematuria, or incontinence  NEUROLOGICAL: No headaches, memory loss, loss of strength, numbness, or tremors  SKIN: No itching, burning, rashes, or lesions   LYMPH Nodes: No enlarged glands  ENDOCRINE: No heat or cold intolerance; No hair loss  MUSCULOSKELETAL: No joint pain or swelling; No muscle, back, or extremity pain  PSYCHIATRIC: No depression, anxiety, mood swings, or difficulty sleeping  HEME/LYMPH: No easy bruising, or bleeding gums  ALLERY AND IMMUNOLOGIC: No hives or eczema	    [ ] All others negative	  [ ] Unable to obtain    PHYSICAL EXAM:  T(C): 36.3 (09-05-17 @ 12:33), Max: 37 (09-04-17 @ 20:59)  HR: 91 (09-05-17 @ 12:33) (63 - 91)  BP: 108/56 (09-05-17 @ 12:33) (108/56 - 180/68)  RR: 18 (09-05-17 @ 12:33) (17 - 20)  SpO2: 100% (09-05-17 @ 12:33) (97% - 100%)  Wt(kg): --  I&O's Summary      Appearance: Normal	  HEENT:   Normal oral mucosa, PERRL, EOMI	  Lymphatic: No lymphadenopathy  Cardiovascular: Normal S1 S2, No JVD, No murmurs, No edema  Respiratory: Lungs clear to auscultation	  Psychiatry: A & O x 3, Mood & affect appropriate  Gastrointestinal:  Soft, Non-tender, + BS	  Skin: No rashes, No ecchymoses, No cyanosis	  Neurologic: Non-focal  Extremities: Normal range of motion, No clubbing, cyanosis or edema  Vascular: Peripheral pulses palpable 2+ bilaterally      LABS:	 	                            11.6   9.23  )-----------( 49       ( 05 Sep 2017 07:00 )             32.4     09-05    138  |  101  |  13  ----------------------------<  117<H>  4.2   |  24  |  0.99  09-04    137  |  98  |  12  ----------------------------<  191<H>  4.4   |  23  |  0.87    Ca    9.1      05 Sep 2017 07:00  Ca    9.7      04 Sep 2017 08:30    TPro  7.4  /  Alb  3.9  /  TBili  0.2  /  DBili  x   /  AST  17  /  ALT  5   /  AlkPhos  100  09-04      proBNP:   Lipid Profile:   HgA1c: Hemoglobin A1C, Whole Blood: 7.1 % (09-05 @ 07:00)    TSH:       CARDIAC MARKERS:            TELEMETRY: 	    ECG:   	  RADIOLOGY:  OTHER: 	      PREVIOUS DIAGNOSTIC CARDIOVASCULAR TESTING:      [ ]  Echocardiogram:  [ ]  Catheterization:  [ ]  Stress test:    [ ]  Vascular studies: Cardiology/Vascular Medicine Inpatient Consultation Note      HISTORY OF PRESENT ILLNESS:  HPI:  85M hx RCC s/p R nephrectomy 4 years ago, recently found to have 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  Pain started 3 days ago and described as sharp pains; he received Tylenol in ER and now pain has completely subsided.  On CT A/P, the patient was incidentally found to have a coronary sinus thrombosis for which the patient is being admitted for.      Pt is a poor historian and family was unable to provide much detail regarding his clot history.  The patient did have a procedure at [place] 1 year ago to remove the clot, which was later confirmed with his outpt oncologist to be an embolectomy.  He is also s/p IVF filter placement and oncologist was unaware of a coronary sinus thrombus on recent imaging.  Pt was taken off of Coumadin by oncologist 2/2 ITP several months ago.  Currently, the patient denies SOB, chest pain, LE swelling, dizziness or any recent fainting episodes.  He describes feeling a "funny feeling" in his L upper chest 2 weeks ago, which went away on its own.  Denies any bruising or bleeding episodes, no epistaxis/gum bleeding, melena/hematochezia. (04 Sep 2017 18:59)    Reviewed CT ab/pel which covered his chest and heart as well.  Noted coronary sinus thrombosis as mentioned above.    Given extensive co-morbid conditions including +ITP with platelet count < 50, advanced age, Parkinson's, and prior treatment for VTE, the bleeding risks associated with anticoagulation at this point likely exceeds benefits.  Case d/w .        Allergies    No Known Allergies    Intolerances    	  MEDICATIONS:  amLODIPine   Tablet 5 milliGRAM(s) Oral daily  hydrALAZINE 25 milliGRAM(s) Oral three times a day  tamsulosin 0.4 milliGRAM(s) Oral at bedtime  enoxaparin Injectable 40 milliGRAM(s) SubCutaneous every 24 hours  aspirin enteric coated 81 milliGRAM(s) Oral daily      carbidopa/levodopa  10/100 2 Tablet(s) Oral three times a day  acetaminophen   Tablet. 650 milliGRAM(s) Oral every 6 hours PRN      simvastatin 20 milliGRAM(s) Oral at bedtime  insulin lispro (HumaLOG) corrective regimen sliding scale   SubCutaneous three times a day before meals  insulin lispro (HumaLOG) corrective regimen sliding scale   SubCutaneous at bedtime  dextrose Gel 1 Dose(s) Oral once PRN  dextrose 50% Injectable 12.5 Gram(s) IV Push once  dextrose 50% Injectable 25 Gram(s) IV Push once  dextrose 50% Injectable 25 Gram(s) IV Push once  glucagon  Injectable 1 milliGRAM(s) IntraMuscular once PRN    dextrose 5%. 1000 milliLiter(s) IV Continuous <Continuous>  pazopanib 200 milliGRAM(s) Oral two times a day      PAST MEDICAL & SURGICAL HISTORY:  Other pulmonary embolism without acute cor pulmonale, unspecified chronicity  DVT (deep venous thrombosis)  Parkinson disease  Renal carcinoma  BPH (benign prostatic hyperplasia)  DM (diabetes mellitus)  HTN (hypertension)  History of embolectomy  History of nephrectomy, unilateral      FAMILY HISTORY:  No pertinent family history in first degree relatives      SOCIAL HISTORY:    NC    REVIEW OF SYSTEMS:  CONSTITUTIONAL: As above  EYES: No eye pain, visual disturbances, or discharge  ENMT:  No difficulty hearing, tinnitus, vertigo; No sinus or throat pain  NECK: No pain or stiffness  RESPIRATORY: No cough, wheezing, chills or hemoptysis; No Shortness of Breath  CARDIOVASCULAR: As above  GASTROINTESTINAL: No abdominal or epigastric pain. No nausea, vomiting, or hematemesis; No diarrhea or constipation. No melena or hematochezia.  GENITOURINARY: No dysuria, frequency, hematuria, or incontinence  NEUROLOGICAL: No headaches, memory loss, loss of strength, numbness, or tremors  SKIN: No itching, burning, rashes, or lesions   LYMPH Nodes: No enlarged glands  ENDOCRINE: No heat or cold intolerance; No hair loss  MUSCULOSKELETAL: No joint pain or swelling; No muscle, back, or extremity pain  PSYCHIATRIC: No depression, anxiety, mood swings, or difficulty sleeping  HEME/LYMPH: No easy bruising, or bleeding gums  ALLERY AND IMMUNOLOGIC: No hives or eczema	    [ ] All others negative	  [ ] Unable to obtain    PHYSICAL EXAM:  T(C): 36.3 (09-05-17 @ 12:33), Max: 37 (09-04-17 @ 20:59)  HR: 91 (09-05-17 @ 12:33) (63 - 91)  BP: 108/56 (09-05-17 @ 12:33) (108/56 - 180/68)  RR: 18 (09-05-17 @ 12:33) (17 - 20)  SpO2: 100% (09-05-17 @ 12:33) (97% - 100%)  Wt(kg): --  I&O's Summary      Appearance: Normal	  HEENT:   Normal oral mucosa, PERRL, EOMI	  Lymphatic: No lymphadenopathy  Cardiovascular: Normal S1 S2, No JVD, No murmurs, No edema  Respiratory: As above  Psychiatry: Awake, alert  Gastrointestinal:  Soft, Non-tender, + BS	  Skin: No rashes, No ecchymoses, No cyanosis	  Neurologic: Non-focal  Extremities: Normal range of motion, No clubbing, cyanosis or edema  Vascular: Peripheral pulses palpable 2+ bilaterally      LABS:	 	                            11.6   9.23  )-----------( 49       ( 05 Sep 2017 07:00 )             32.4     09-05    138  |  101  |  13  ----------------------------<  117<H>  4.2   |  24  |  0.99  09-04    137  |  98  |  12  ----------------------------<  191<H>  4.4   |  23  |  0.87    Ca    9.1      05 Sep 2017 07:00  Ca    9.7      04 Sep 2017 08:30    TPro  7.4  /  Alb  3.9  /  TBili  0.2  /  DBili  x   /  AST  17  /  ALT  5   /  AlkPhos  100  09-04      proBNP:   Lipid Profile:   HgA1c: Hemoglobin A1C, Whole Blood: 7.1 % (09-05 @ 07:00)    TSH:       CARDIAC MARKERS:

## 2017-09-05 NOTE — PROGRESS NOTE ADULT - PROBLEM SELECTOR PLAN 1
- Incidental finding of coronary sinus thrombosis; as per oncologist, he was not aware of any recent findings of thrombus on imaging.    - Given history of DVT/PE and active malignancy, patient is at high VTE risk.  Thrombus is likely also a consequence of having catheterization/embolectomy/manipulation of R heart in past.  Pt is currently hemodynamically stable, not hypoxic, no tachycardia, no signs/symptoms to suggest acute PE.   - Risks/benefits of anticoagulation were discussed extensively with pt and family and they would be willing to pursue anticoagulation despite bleed risk.   - CT head negative for hemorrhage/metastatic disease  - Will further discuss with cardiology and hematology.

## 2017-09-05 NOTE — CONSULT NOTE ADULT - PROBLEM SELECTOR RECOMMENDATION 2
- plt 40 on 2nd day - plt 49 today  - continue to monitor platelet count. Patient is not bleeding at this time  - not on promacta at this time  - would obtain records from outpatient oncologist. Unclear if patient had a DVT/PE on promacta as it can be prothrombotic

## 2017-09-05 NOTE — CONSULT NOTE ADULT - SUBJECTIVE AND OBJECTIVE BOX
Patient is a 85y old  Male who presents with a chief complaint of RLQ pain      HPI:  85M hx RCC s/p R nephrectomy 4 years ago, recently found to have recurrence of cancer in retoperitoneal lymph nodes and started on Votrient 4 weeks ago, HTN, DM2, BPH, ITP, early Parkinson's disease, RLE DVT and PE 1 year ago s/p embolectomy at [place] (now off Coumadin) and IVC filter p/w RLQ pain, admitted for an incidental finding of a coronary sinus thrombosis. His RLQ pain started 3 days ago and described as sharp pains; he received Tylenol in ER and now pain has completely subsided. Pt is a poor historian and most of the information was taken from the chart. Pt was taken off of Coumadin by oncologist 2/2 ITP several months ago. Currently, he denies SOB, chest pain, LE swelling, dizziness.  He describes feeling a "funny feeling" in his L upper chest 2 weeks ago, which went away on its own.  Denies any bruising or bleeding episodes, no epistaxis/gum bleeding, melena/hematochezia.      PMHx/PSHx:   PAST MEDICAL & SURGICAL HISTORY:  Other pulmonary embolism without acute cor pulmonale, unspecified chronicity  DVT (deep venous thrombosis)  Parkinson disease  Renal carcinoma  BPH (benign prostatic hyperplasia)  DM (diabetes mellitus)  HTN (hypertension)  History of embolectomy  History of nephrectomy, unilateral      Social Hx: Lives with his wife. Denies smoking, alcohol or illicit drugs    FHx: non-contributory    Allergies: Allergies    No Known Allergies    Intolerances        Medications Standing   MEDICATIONS  (STANDING):  primidone 250 milliGRAM(s) Oral two times a day  simvastatin 20 milliGRAM(s) Oral at bedtime  amLODIPine   Tablet 5 milliGRAM(s) Oral daily  hydrALAZINE 25 milliGRAM(s) Oral three times a day  insulin lispro (HumaLOG) corrective regimen sliding scale   SubCutaneous three times a day before meals  insulin lispro (HumaLOG) corrective regimen sliding scale   SubCutaneous at bedtime  dextrose 5%. 1000 milliLiter(s) (50 mL/Hr) IV Continuous <Continuous>  dextrose 50% Injectable 12.5 Gram(s) IV Push once  dextrose 50% Injectable 25 Gram(s) IV Push once  dextrose 50% Injectable 25 Gram(s) IV Push once  tamsulosin 0.4 milliGRAM(s) Oral at bedtime  carbidopa/levodopa  10/100 2 Tablet(s) Oral three times a day  enoxaparin Injectable 40 milliGRAM(s) SubCutaneous every 24 hours  aspirin enteric coated 81 milliGRAM(s) Oral daily  pazopanib 200 milliGRAM(s) Oral two times a day      Medications PRN   MEDICATIONS  (PRN):  dextrose Gel 1 Dose(s) Oral once PRN Blood Glucose LESS THAN 70 milliGRAM(s)/deciliter  glucagon  Injectable 1 milliGRAM(s) IntraMuscular once PRN Glucose LESS THAN 70 milligrams/deciliter  acetaminophen   Tablet. 650 milliGRAM(s) Oral every 6 hours PRN Mild Pain (1 - 3)      Physical Exam:     Vital Signs Last 24 Hrs  T(C): 36.6 (05 Sep 2017 04:56), Max: 37 (04 Sep 2017 20:59)  T(F): 97.9 (05 Sep 2017 04:56), Max: 98.6 (04 Sep 2017 20:59)  HR: 81 (05 Sep 2017 04:56) (62 - 81)  BP: 136/74 (05 Sep 2017 04:56) (136/74 - 182/77)  BP(mean): --  RR: 18 (05 Sep 2017 04:56) (17 - 20)  SpO2: 100% (05 Sep 2017 04:56) (97% - 181%)    General: NAD   HEENT: EOMI, PEERL, Nl OP, Nl thyroid gland, No lymphadenopathy, No JVD   CVS: RRR, No murmurs/gallops/regurg  Resp: CTA bilaterally, no rhonchi, rales, wheeze  Abd: Nl BS, soft, NT, ND   Ext: no edema   Skin: no skin lesions or ulcers noted    LABS   CBC                       11.6   9.23  )-----------( 49       ( 05 Sep 2017 07:00 )             32.4     CMP 09-05    138  |  101  |  13  ----------------------------<  117<H>  4.2   |  24  |  0.99    Ca    9.1      05 Sep 2017 07:00    TPro  7.4  /  Alb  3.9  /  TBili  0.2  /  DBili  x   /  AST  17  /  ALT  5   /  AlkPhos  100  09-04    PT/INR - ( 05 Sep 2017 07:00 )   PT: 12.4 SEC;   INR: 1.10          PTT - ( 05 Sep 2017 07:00 )  PTT:32.3 SEC    EKG reviewed: normal sinus with 1st degree AV block and occasional PVCs Patient is a 85y old  Male who presents with a chief complaint of RLQ pain      HPI:  85M hx RCC s/p R nephrectomy 4 years ago, recently found to have 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 His RLQ pain started 3 days ago and described as sharp pains; he received Tylenol in ER and now pain has completely subsided. Pt is a poor historian and most of the information was taken from the chart. Pt was taken off of Coumadin by oncologist 2/2 ITP several months ago. Regarding history of ITP, patient was on Promacta in the past. Currently, he denies SOB, chest pain, LE swelling, dizziness.  He describes feeling a "funny feeling" in his L upper chest 2 weeks ago, which went away on its own.  Denies any bruising or bleeding episodes, no epistaxis/gum bleeding, melena/hematochezia.      PMHx/PSHx:   PAST MEDICAL & SURGICAL HISTORY:  Other pulmonary embolism without acute cor pulmonale, unspecified chronicity  DVT (deep venous thrombosis)  Parkinson disease  Renal carcinoma  BPH (benign prostatic hyperplasia)  DM (diabetes mellitus)  HTN (hypertension)  History of embolectomy  History of nephrectomy, unilateral      Social Hx: Lives with his wife. Denies smoking, alcohol or illicit drugs    FHx: non-contributory    Allergies: Allergies    No Known Allergies    Intolerances        Medications Standing   MEDICATIONS  (STANDING):  primidone 250 milliGRAM(s) Oral two times a day  simvastatin 20 milliGRAM(s) Oral at bedtime  amLODIPine   Tablet 5 milliGRAM(s) Oral daily  hydrALAZINE 25 milliGRAM(s) Oral three times a day  insulin lispro (HumaLOG) corrective regimen sliding scale   SubCutaneous three times a day before meals  insulin lispro (HumaLOG) corrective regimen sliding scale   SubCutaneous at bedtime  dextrose 5%. 1000 milliLiter(s) (50 mL/Hr) IV Continuous <Continuous>  dextrose 50% Injectable 12.5 Gram(s) IV Push once  dextrose 50% Injectable 25 Gram(s) IV Push once  dextrose 50% Injectable 25 Gram(s) IV Push once  tamsulosin 0.4 milliGRAM(s) Oral at bedtime  carbidopa/levodopa  10/100 2 Tablet(s) Oral three times a day  enoxaparin Injectable 40 milliGRAM(s) SubCutaneous every 24 hours  aspirin enteric coated 81 milliGRAM(s) Oral daily  pazopanib 200 milliGRAM(s) Oral two times a day      Medications PRN   MEDICATIONS  (PRN):  dextrose Gel 1 Dose(s) Oral once PRN Blood Glucose LESS THAN 70 milliGRAM(s)/deciliter  glucagon  Injectable 1 milliGRAM(s) IntraMuscular once PRN Glucose LESS THAN 70 milligrams/deciliter  acetaminophen   Tablet. 650 milliGRAM(s) Oral every 6 hours PRN Mild Pain (1 - 3)      Physical Exam:     Vital Signs Last 24 Hrs  T(C): 36.6 (05 Sep 2017 04:56), Max: 37 (04 Sep 2017 20:59)  T(F): 97.9 (05 Sep 2017 04:56), Max: 98.6 (04 Sep 2017 20:59)  HR: 81 (05 Sep 2017 04:56) (62 - 81)  BP: 136/74 (05 Sep 2017 04:56) (136/74 - 182/77)  BP(mean): --  RR: 18 (05 Sep 2017 04:56) (17 - 20)  SpO2: 100% (05 Sep 2017 04:56) (97% - 181%)    General: NAD   HEENT: EOMI, PEERL, Nl OP, Nl thyroid gland, No lymphadenopathy, No JVD   CVS: RRR, No murmurs/gallops/regurg  Resp: CTA bilaterally, no rhonchi, rales, wheeze  Abd: Nl BS, soft, NT, ND   Ext: no edema   Skin: no skin lesions or ulcers noted    LABS   CBC                       11.6   9.23  )-----------( 49       ( 05 Sep 2017 07:00 )             32.4     CMP 09-05    138  |  101  |  13  ----------------------------<  117<H>  4.2   |  24  |  0.99    Ca    9.1      05 Sep 2017 07:00    TPro  7.4  /  Alb  3.9  /  TBili  0.2  /  DBili  x   /  AST  17  /  ALT  5   /  AlkPhos  100  09-04    PT/INR - ( 05 Sep 2017 07:00 )   PT: 12.4 SEC;   INR: 1.10          PTT - ( 05 Sep 2017 07:00 )  PTT:32.3 SEC    EKG reviewed: normal sinus with 1st degree AV block and occasional PVCs

## 2017-09-05 NOTE — CONSULT NOTE ADULT - ASSESSMENT
History of VTE and coronary sinus thrombosis likely 2/2 hypercoagulable state with prior history of cancer/RP mass/active cancer.  Anticoagulation was already previously stopped by PMD 2/2 ITP.  Still with ITP with platelet count < 50.  Given this, along with extensive co-morbid conditions as outlined above, anticoagulation likely poses high risks > benefits.  D/W patient and Medicine team/Dr. Byrd.

## 2017-09-05 NOTE — CONSULT NOTE ADULT - PROBLEM SELECTOR RECOMMENDATION 9
- Patient is asymptomatic at this time; unclear when this actually occurred  - Likely to be chronic at this time as patient may have developed some collaterals around the thrombus. EKG does not show ST-T changes, patient is hemodynamically stable  - Likely 2/2 cardiac procedure in the past vs primary thrombus (2/2 ITP or promacta; ITP and treatment regimen for ITP can be thrombogenic)  - Continue to monitor CE and repeat EKG and continue on telemetry  - Agree with cardiology consult   - This entity can become severe in the acute state - Patient is asymptomatic at this time; unclear when this actually occurred but likely to be chronic at this time as patient may have developed some collaterals around the thrombus. EKG does not show ST-T changes, patient is hemodynamically stable  - Likely 2/2 cardiac procedure in the past vs primary thrombus (2/2 malignancy, ITP or promacta; ITP and treatment regimen for ITP can be pro-thrombotic)  - Consider a repeat EKG, CE and continue on telemetry  - Agree with cardiology consult   - This entity can become life threatening in the acute state. If patient's clinical condition worsens, could consider half dose therapeutic heparin if platelet <50,000. Would need to consider promacta to increase his platelet count due to high risk of bleeding. Steroids and IVIG if platelet count drops less than 30,000. Would consider thrombectomy if patient is severely symptomatic. - Patient is asymptomatic at this time; unclear when this actually occurred but likely to be chronic at this time as patient may have developed some collaterals around the thrombus. EKG does not show ST-T changes, patient is hemodynamically stable  - Likely 2/2 cardiac procedure in the past vs primary thrombus (2/2 malignancy, ITP or promacta; ITP and treatment regimen for ITP can be pro-thrombotic)  - Consider a repeat EKG, CE and continue on telemetry. Consider a TTE. Patient may not be a candidate for a cath given his age, platelet count and malignancy.  - Agree with cardiology consult   - This entity can become life threatening in the acute state. If patient's clinical condition worsens, could consider half dose therapeutic heparin if platelet <50,000. Would need to consider promacta to increase his platelet count due to high risk of bleeding. Steroids and IVIG if platelet count drops less than 30,000. Would consider thrombectomy if patient is severely symptomatic.

## 2017-09-05 NOTE — CONSULT NOTE ADULT - ATTENDING COMMENTS
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 Will need further information from his hematologist/oncologist as pt is a poor historian. Will need to discuss with cardiology whether he needs to be AC'd for this coronary sinus thrombosis as his plt count is <50k at this time. If he does need AC, will need treatment for his ITP in order to anticoagulate safely.

## 2017-09-06 VITALS
RESPIRATION RATE: 18 BRPM | HEART RATE: 72 BPM | TEMPERATURE: 99 F | OXYGEN SATURATION: 100 % | SYSTOLIC BLOOD PRESSURE: 128 MMHG | DIASTOLIC BLOOD PRESSURE: 68 MMHG

## 2017-09-06 LAB
-  AMIKACIN: SIGNIFICANT CHANGE UP
-  AMPICILLIN/SULBACTAM: SIGNIFICANT CHANGE UP
-  AMPICILLIN: SIGNIFICANT CHANGE UP
-  AZTREONAM: SIGNIFICANT CHANGE UP
-  CEFAZOLIN: SIGNIFICANT CHANGE UP
-  CEFEPIME: SIGNIFICANT CHANGE UP
-  CEFOXITIN: SIGNIFICANT CHANGE UP
-  CEFTAZIDIME: SIGNIFICANT CHANGE UP
-  CEFTRIAXONE: SIGNIFICANT CHANGE UP
-  CIPROFLOXACIN: SIGNIFICANT CHANGE UP
-  ERTAPENEM: SIGNIFICANT CHANGE UP
-  GENTAMICIN: SIGNIFICANT CHANGE UP
-  IMIPENEM: SIGNIFICANT CHANGE UP
-  LEVOFLOXACIN: SIGNIFICANT CHANGE UP
-  MEROPENEM: SIGNIFICANT CHANGE UP
-  NITROFURANTOIN: SIGNIFICANT CHANGE UP
-  PIPERACILLIN/TAZOBACTAM: SIGNIFICANT CHANGE UP
-  TIGECYCLINE: SIGNIFICANT CHANGE UP
-  TOBRAMYCIN: SIGNIFICANT CHANGE UP
-  TRIMETHOPRIM/SULFAMETHOXAZOLE: SIGNIFICANT CHANGE UP
APTT BLD: 29.9 SEC — SIGNIFICANT CHANGE UP (ref 27.5–37.4)
APTT BLD: > 200 SEC — CRITICAL HIGH (ref 27.5–37.4)
BACTERIA UR CULT: SIGNIFICANT CHANGE UP
BUN SERPL-MCNC: 18 MG/DL — SIGNIFICANT CHANGE UP (ref 7–23)
CALCIUM SERPL-MCNC: 9.6 MG/DL — SIGNIFICANT CHANGE UP (ref 8.4–10.5)
CHLORIDE SERPL-SCNC: 100 MMOL/L — SIGNIFICANT CHANGE UP (ref 98–107)
CO2 SERPL-SCNC: 21 MMOL/L — LOW (ref 22–31)
CREAT SERPL-MCNC: 1.09 MG/DL — SIGNIFICANT CHANGE UP (ref 0.5–1.3)
GLUCOSE SERPL-MCNC: 120 MG/DL — HIGH (ref 70–99)
HCT VFR BLD CALC: 34.8 % — LOW (ref 39–50)
HGB BLD-MCNC: 11.3 G/DL — LOW (ref 13–17)
INR BLD: 1.1 — SIGNIFICANT CHANGE UP (ref 0.88–1.17)
INR BLD: 2.33 — HIGH (ref 0.88–1.17)
MCHC RBC-ENTMCNC: 31.5 PG — SIGNIFICANT CHANGE UP (ref 27–34)
MCHC RBC-ENTMCNC: 32.5 % — SIGNIFICANT CHANGE UP (ref 32–36)
MCV RBC AUTO: 96.9 FL — SIGNIFICANT CHANGE UP (ref 80–100)
METHOD TYPE: SIGNIFICANT CHANGE UP
NRBC # FLD: 0 — SIGNIFICANT CHANGE UP
ORGANISM # SPEC MICROSCOPIC CNT: SIGNIFICANT CHANGE UP
ORGANISM # SPEC MICROSCOPIC CNT: SIGNIFICANT CHANGE UP
PLATELET # BLD AUTO: 34 K/UL — LOW (ref 150–400)
PMV BLD: SIGNIFICANT CHANGE UP FL (ref 7–13)
POTASSIUM SERPL-MCNC: 4.5 MMOL/L — SIGNIFICANT CHANGE UP (ref 3.5–5.3)
POTASSIUM SERPL-SCNC: 4.5 MMOL/L — SIGNIFICANT CHANGE UP (ref 3.5–5.3)
PROTHROM AB SERPL-ACNC: 12.3 SEC — SIGNIFICANT CHANGE UP (ref 9.8–13.1)
PROTHROM AB SERPL-ACNC: 26.6 SEC — HIGH (ref 9.8–13.1)
RBC # BLD: 3.59 M/UL — LOW (ref 4.2–5.8)
RBC # FLD: 14.4 % — SIGNIFICANT CHANGE UP (ref 10.3–14.5)
SODIUM SERPL-SCNC: 136 MMOL/L — SIGNIFICANT CHANGE UP (ref 135–145)
WBC # BLD: 6.41 K/UL — SIGNIFICANT CHANGE UP (ref 3.8–10.5)
WBC # FLD AUTO: 6.41 K/UL — SIGNIFICANT CHANGE UP (ref 3.8–10.5)

## 2017-09-06 PROCEDURE — 99232 SBSQ HOSP IP/OBS MODERATE 35: CPT

## 2017-09-06 PROCEDURE — 99239 HOSP IP/OBS DSCHRG MGMT >30: CPT

## 2017-09-06 RX ORDER — OXYCODONE HYDROCHLORIDE 5 MG/1
5 TABLET ORAL ONCE
Qty: 0 | Refills: 0 | Status: DISCONTINUED | OUTPATIENT
Start: 2017-09-06 | End: 2017-09-06

## 2017-09-06 RX ORDER — PAZOPANIB HYDROCHLORIDE 200 MG/1
1 TABLET, FILM COATED ORAL
Qty: 0 | Refills: 0 | COMMUNITY
Start: 2017-09-06

## 2017-09-06 RX ORDER — PAZOPANIB HYDROCHLORIDE 200 MG/1
1 TABLET, FILM COATED ORAL
Qty: 0 | Refills: 0 | COMMUNITY

## 2017-09-06 RX ORDER — ASPIRIN/CALCIUM CARB/MAGNESIUM 324 MG
1 TABLET ORAL
Qty: 0 | Refills: 0 | COMMUNITY

## 2017-09-06 RX ORDER — PRIMIDONE 250 MG/1
1 TABLET ORAL
Qty: 0 | Refills: 0 | COMMUNITY

## 2017-09-06 RX ORDER — ASPIRIN/CALCIUM CARB/MAGNESIUM 324 MG
1 TABLET ORAL
Qty: 0 | Refills: 0 | COMMUNITY
Start: 2017-09-06

## 2017-09-06 RX ADMIN — CARBIDOPA AND LEVODOPA 2 TABLET(S): 25; 100 TABLET ORAL at 13:50

## 2017-09-06 RX ADMIN — Medication 25 MILLIGRAM(S): at 13:50

## 2017-09-06 RX ADMIN — OXYCODONE HYDROCHLORIDE 5 MILLIGRAM(S): 5 TABLET ORAL at 06:07

## 2017-09-06 RX ADMIN — AMLODIPINE BESYLATE 5 MILLIGRAM(S): 2.5 TABLET ORAL at 05:24

## 2017-09-06 RX ADMIN — OXYCODONE HYDROCHLORIDE 5 MILLIGRAM(S): 5 TABLET ORAL at 05:37

## 2017-09-06 RX ADMIN — CARBIDOPA AND LEVODOPA 2 TABLET(S): 25; 100 TABLET ORAL at 05:24

## 2017-09-06 RX ADMIN — Medication 81 MILLIGRAM(S): at 13:50

## 2017-09-06 RX ADMIN — Medication 2: at 12:05

## 2017-09-06 RX ADMIN — Medication 25 MILLIGRAM(S): at 05:23

## 2017-09-06 NOTE — DISCHARGE NOTE ADULT - HOSPITAL COURSE
85M hx RCC s/p R nephrectomy 4 years ago, recently found to have recurrence of cancer in RP lymph nodes and started on Votrient 4 weeks ago, HTN, DM2, BPH, ITP on Promacta (as per oncologist, but pt did not provide this on med list), early Parkinson's disease, RLE DVT and PE 1 year ago s/p embolectomy at [place] (now off Coumadin) presents with RLQ pain.  RLQ pain likely referred pain from recurrence of R retroperitoneal mass, which has now resolved, but incidentally found to have a coronary sinus thrombosis of unknown acuity/chronicity  Coronary Sinus Thrombosis   - Vasc Card Dr Swift consulted   - Given extensive co-morbid conditions including +ITP with platelet count < 50, advanced age, the bleeding risks associated with A/C at this point  exceeds benefits.   - CT head -neg   - CT A/P- Heterogeneously enhancing right retroperitoneal mass abutting and displacing the left renal vein and IVC without evidence of invasion or tumor thrombus. Findings are consistent with recurrent neoplasm. Thrombus in the coronary sinus.    ITP   - Promacta in the past   - Heme / Onc consulted  - continue to monitor platelet count. Patient is not bleeding at this time  - not on Promacta at this time  - would obtain records from outpatient oncologist Dr Bro.    Metastatic Cancer   - Continue with Votrient   - f/u Oncology as outpt    DM2   - HgA1C -7.1%    -Sliding scale     HTN   - Continue home meds 85M hx RCC s/p R nephrectomy 4 years ago, recently found to have recurrence of cancer in RP lymph nodes and started on Votrient 4 weeks ago, HTN, DM2, BPH, ITP formerly on Promacta (pt hasn't taken for over a month), early Parkinson's disease, RLE DVT and PE 1 year ago s/p embolectomy at [place] (now off Coumadin) presents with RLQ pain.  RLQ pain likely referred pain from recurrence of R retroperitoneal mass, which has now resolved, but incidentally found to have a coronary sinus thrombosis of unknown acuity/chronicity.  Vascular cardiology and hematology evaluated the patient.  It was deemed that the risks of anticoagulation outweighed the benefits and pt was not initiated on blood thinners.  Pt was advised to follow up with PMD and outpatient oncologist for further management of his malignancy and ITP.  Case was discussed extensively with outpatient oncologist (Dr. Bro) and he was informed that pt will not be started on anticoagulation.  Dr. Bro to continue outpatient chemotherapy and discuss resumption of Promacta as outpatient.  On day of discharge, pt's coagulation studies were abnormal, likely lab error as prior day's coag studies were completely normal.  Pt was medically stable for discharge. 85M hx RCC s/p R nephrectomy 4 years ago, recently found to have recurrence of cancer in RP lymph nodes and started on Votrient 4 weeks ago, HTN, DM2, BPH, ITP formerly on Promacta (pt hasn't taken for over a month), early Parkinson's disease, RLE DVT and PE 1 year ago s/p embolectomy at [place] (now off Coumadin) presents with RLQ pain.  RLQ pain likely referred pain from recurrence of R retroperitoneal mass, which has now resolved, but incidentally found to have a coronary sinus thrombosis of unknown acuity/chronicity.  Vascular cardiology and hematology evaluated the patient.  It was deemed that the risks of anticoagulation outweighed the benefits and pt was not initiated on blood thinners.  Pt was advised to follow up with PMD and outpatient oncologist for further management of his malignancy and ITP.  Case was discussed extensively with outpatient oncologist (Dr. Bro) and he was informed that pt will not be started on anticoagulation.  Dr. Bro to continue outpatient chemotherapy and discuss resumption of Promacta as outpatient.  On day of discharge, pt's coagulation studies were abnormal, likely lab error as prior day's coag studies were completely normal.  Pt was medically stable for discharge.

## 2017-09-06 NOTE — PROGRESS NOTE ADULT - ASSESSMENT
85M hx RCC s/p R nephrectomy 4 years ago, recently found to have recurrence of cancer in RP lymph nodes and started on Votrient 4 weeks ago, HTN, DM2, BPH, ITP on Promacta (as per oncologist, but pt did not provide this on med list), early Parkinson's disease, RLE DVT and PE 1 year ago s/p embolectomy at [place] (now off Coumadin) presents with RLQ pain.  RLQ pain likely referred pain from recurrence of R retroperitoneal mass, which has now resolved, but incidentally found to have a coronary sinus thrombosis of unknown acuity/chronicity.

## 2017-09-06 NOTE — PROGRESS NOTE ADULT - PROBLEM SELECTOR PLAN 2
- Pt did not report medications for his ITP, but oncologist states that he was prescribed Promacta 75mg in past.  As per wife, pt has not been taking this medication recently.  Outpatient heme/onc (Dr. Bro) aware and will f/u next week.   - Will not start Promacta at this time given prothrombotic potential of the medication

## 2017-09-06 NOTE — DISCHARGE NOTE ADULT - PROVIDER TOKENS
TOKEN:'[id number]',FREE:[LAST:[Juarez],FIRST:[Adrienne],PHONE:[(   )    -],FAX:[(   )    -],ADDRESS:[ONCOLOGY]]

## 2017-09-06 NOTE — PROGRESS NOTE ADULT - PROBLEM SELECTOR PLAN 3
- Continue with Votrient  - F/U with outpatient oncology for mgmt of metastatic RCC  - Pain control with Tylenol PRN
- Continue with Votrient.  Pharmacist discovered a major drug interaction with Votrient and Primidone (home med).  Will d/c Primidone.  Dr. Bro has been informed of this interaction and agrees with d/c primidone.   - Pt to f/u with outpatient oncologist (Dr. Bro) early next week   - Pain control with Tylenol PRN

## 2017-09-06 NOTE — DISCHARGE NOTE ADULT - CARE PROVIDER_API CALL
Luigi Swain (MD), Internal Medicine  [id number] [place] [address]  Phone: [phone]  Fax: [phone]    Adrienne Juarez  Phone: (   )    -  Fax: (   )    -

## 2017-09-06 NOTE — DISCHARGE NOTE ADULT - PATIENT PORTAL LINK FT
“You can access the [facility] Patient Portal, offered by [facility], by registering with the following website: [email or web address]”

## 2017-09-06 NOTE — DISCHARGE NOTE ADULT - ADDITIONAL INSTRUCTIONS
Please follow up with oncologist, Dr. Juarez within one week.  Please follow up with cardiologist within one week.  Please follow up with your PMD within one week.

## 2017-09-06 NOTE — DISCHARGE NOTE ADULT - CARE PLAN
Principal Discharge DX:	Coronary sinus abnormality  Goal:	well managed  Instructions for follow-up, activity and diet:	We incidentally found that you have a coronary sinus thrombosis of unknown acuity/chronicity. As per oncologist, he was not aware of any recent findings of thrombus on imaging.  Discussed case with cardiology; will not initiate anticoagulation at this time given multiple comorbidities of active CA and ITP.  Please follow up with Dr. Juarez, the oncologist within 5 days and PMD, Dr. Swain. Call your doctor immediately if you developed any chest pain, fever or increased shortness of breath.  Secondary Diagnosis:	DVT (deep venous thrombosis)  Goal:	stable  Instructions for follow-up, activity and diet:	As per cardiology; will not initiate anticoagulation at this time given multiple comorbidities of active CA and ITP. Please follow up with your PMD within one week. Please call your doctor immediately if you developed any chest pain, shortness of breath or palpitations.  Secondary Diagnosis:	Idiopathic thrombocytopenia  Instructions for follow-up, activity and diet:	Please follow up with Dr. Juarez, the oncologist outpatient within one week. Promacta 75mg on hold because has prothrombotic potential of the medication.  Secondary Diagnosis:	Metastatic cancer  Instructions for follow-up, activity and diet:	Please continue with Votrient as per oncologist, Dr. Juarez. Please follow up with oncologist within one week.  Secondary Diagnosis:	Essential hypertension  Goal:	well controlled  Instructions for follow-up, activity and diet:	Please continue your medications as prescribed, follow up with PMD within one week.  Secondary Diagnosis:	Type 2 diabetes mellitus without complication, without long-term current use of insulin  Goal:	well controlled  Instructions for follow-up, activity and diet:	Please continue your medications as prescribed, follow up with PMD within one week. Principal Discharge DX:	Coronary sinus abnormality  Goal:	well managed  Instructions for follow-up, activity and diet:	We incidentally found that you have a coronary sinus thrombosis of unknown acuity/chronicity. As per oncologist, he was not aware of any recent findings of thrombus on imaging.  Discussed case with cardiology; will not initiate anticoagulation at this time given multiple comorbidities of active CA and ITP.  Please follow up with Dr. Juarez, the oncologist within 5 days and PMD Dr. Swain. Call your doctor immediately if you developed any chest pain, fever or increased shortness of breath.  Secondary Diagnosis:	DVT (deep venous thrombosis)  Goal:	stable  Instructions for follow-up, activity and diet:	As per cardiology; will not initiate anticoagulation at this time given multiple comorbidities of active CA and ITP. Please follow up with your PMD within one week. Please call your doctor immediately if you developed any chest pain, shortness of breath or palpitations.  Secondary Diagnosis:	Idiopathic thrombocytopenia  Instructions for follow-up, activity and diet:	Please follow up with Dr. Juarez, the oncologist outpatient within one week. Promacta 75mg on hold because has prothrombotic potential of the medication.  Secondary Diagnosis:	Metastatic cancer  Instructions for follow-up, activity and diet:	Please continue with Votrient as per oncologist, Dr. Juarez. Please follow up with oncologist within one week.  Secondary Diagnosis:	Essential hypertension  Goal:	well controlled  Instructions for follow-up, activity and diet:	Please continue your medications as prescribed, follow up with PMD within one week.  Secondary Diagnosis:	Type 2 diabetes mellitus without complication, without long-term current use of insulin  Goal:	well controlled  Instructions for follow-up, activity and diet:	Please continue your medications as prescribed, follow up with PMD within one week. Principal Discharge DX:	Coronary sinus abnormality  Goal:	well managed  Instructions for follow-up, activity and diet:	We incidentally found that you have a coronary sinus thrombosis of unknown acuity/chronicity. As per oncologist, he was not aware of any recent findings of thrombus on imaging.  Discussed case with cardiology; will not initiate anticoagulation at this time given multiple comorbidities of active CA and ITP.  Please follow up with Dr. Juarez, the oncologist within 5 days and PMD Dr. Swain. Call your doctor immediately if you developed any chest pain, fever or increased shortness of breath.  Secondary Diagnosis:	DVT (deep venous thrombosis)  Goal:	stable  Instructions for follow-up, activity and diet:	As per cardiology; will not initiate anticoagulation at this time given multiple comorbidities of active CA and ITP. Please follow up with your PMD within one week. Please call your doctor immediately if you developed any chest pain, shortness of breath or palpitations.  Secondary Diagnosis:	Idiopathic thrombocytopenia  Instructions for follow-up, activity and diet:	Please follow up with Dr. Juarez, the oncologist outpatient within one week. Promacta 75mg on hold because has prothrombotic potential of the medication.  Secondary Diagnosis:	Metastatic cancer  Instructions for follow-up, activity and diet:	Please continue with Votrient as per oncologist, Dr. Juarez. Please follow up with oncologist within one week.  Secondary Diagnosis:	Essential hypertension  Goal:	well controlled  Instructions for follow-up, activity and diet:	Please continue your medications as prescribed, follow up with PMD within one week. Monitor blood pressure at home prior to taking medications. Follow low sodium diet.  Secondary Diagnosis:	Type 2 diabetes mellitus without complication, without long-term current use of insulin  Goal:	well controlled  Instructions for follow-up, activity and diet:	Your HgA1C level is 7.1%. Please continue your medications Glipizide as prescribed, follow up with PMD within one week for further management.

## 2017-09-06 NOTE — PROGRESS NOTE ADULT - SUBJECTIVE AND OBJECTIVE BOX
Patient is a 85y old  Male who presents with a chief complaint of RLQ pain (04 Sep 2017 18:59)      SUBJECTIVE / OVERNIGHT EVENTS: Had additional episode of RLQ this morning, resolved with PO oxy 5mg x1.  Currently pain free.         MEDICATIONS  (STANDING):  simvastatin 20 milliGRAM(s) Oral at bedtime  amLODIPine   Tablet 5 milliGRAM(s) Oral daily  hydrALAZINE 25 milliGRAM(s) Oral three times a day  insulin lispro (HumaLOG) corrective regimen sliding scale   SubCutaneous three times a day before meals  insulin lispro (HumaLOG) corrective regimen sliding scale   SubCutaneous at bedtime  dextrose 5%. 1000 milliLiter(s) (50 mL/Hr) IV Continuous <Continuous>  dextrose 50% Injectable 12.5 Gram(s) IV Push once  dextrose 50% Injectable 25 Gram(s) IV Push once  dextrose 50% Injectable 25 Gram(s) IV Push once  tamsulosin 0.4 milliGRAM(s) Oral at bedtime  carbidopa/levodopa  10/100 2 Tablet(s) Oral three times a day  enoxaparin Injectable 40 milliGRAM(s) SubCutaneous every 24 hours  aspirin enteric coated 81 milliGRAM(s) Oral daily  pazopanib 200 milliGRAM(s) Oral two times a day    MEDICATIONS  (PRN):  dextrose Gel 1 Dose(s) Oral once PRN Blood Glucose LESS THAN 70 milliGRAM(s)/deciliter  glucagon  Injectable 1 milliGRAM(s) IntraMuscular once PRN Glucose LESS THAN 70 milligrams/deciliter  acetaminophen   Tablet. 650 milliGRAM(s) Oral every 6 hours PRN Mild Pain (1 - 3)      Vital Signs Last 24 Hrs  T(C): 36.2 (06 Sep 2017 05:18), Max: 37.2 (05 Sep 2017 21:21)  T(F): 97.1 (06 Sep 2017 05:18), Max: 99 (05 Sep 2017 22:33)  HR: 70 (06 Sep 2017 05:18) (66 - 91)  BP: 132/74 (06 Sep 2017 05:18) (108/56 - 142/72)  BP(mean): --  RR: 18 (06 Sep 2017 05:18) (17 - 18)  SpO2: 100% (06 Sep 2017 05:18) (100% - 100%)  CAPILLARY BLOOD GLUCOSE  229 (06 Sep 2017 12:02)  125 (06 Sep 2017 07:58)  165 (05 Sep 2017 21:15)  118 (05 Sep 2017 17:25)  238 (05 Sep 2017 12:33)      PHYSICAL EXAM  GENERAL: NAD, well-developed  HEAD:  Atraumatic, Normocephalic  EYES: EOMI, PERRLA, conjunctiva and sclera clear  NECK: Supple, No JVD  CHEST/LUNG: Clear to auscultation bilaterally; No wheeze  HEART: Regular rate and rhythm; No murmurs, rubs, or gallops  ABDOMEN: Soft, Nontender, Nondistended; Bowel sounds present  EXTREMITIES:  2+ Peripheral Pulses, No clubbing, cyanosis, or edema  PSYCH: AAOx2-3  SKIN: No rashes or lesions    LABS:                        11.3   6.41  )-----------( 34       ( 06 Sep 2017 05:45 )             34.8     09-06    136  |  100  |  18  ----------------------------<  120<H>  4.5   |  21<L>  |  1.09    Ca    9.6      06 Sep 2017 05:45      PT/INR - ( 06 Sep 2017 05:45 )   PT: 26.6 SEC;   INR: 2.33          PTT - ( 06 Sep 2017 05:45 )  PTT:> 200.0 SEC          RADIOLOGY & ADDITIONAL TESTS:    Imaging Personally Reviewed: < from: CT Abdomen and Pelvis w/ IV Cont (09.04.17 @ 10:37) >  IMPRESSION:     Heterogeneously enhancing right retroperitoneal mass abutting and   displacing the left renal vein and IVC without evidence of invasion or   tumor thrombus. Findings are consistent with recurrent neoplasm.    Thrombus in the coronary sinus.    < end of copied text >    Consultant(s) Notes Reviewed:  Cards, Heme    Care Discussed with Consultants/Other Providers: Cards, outpatient oncologist

## 2017-09-06 NOTE — DISCHARGE NOTE ADULT - MEDICATION SUMMARY - MEDICATIONS TO STOP TAKING
I will STOP taking the medications listed below when I get home from the hospital:    primidone 250 mg oral tablet  -- 1 tab(s) by mouth 2 times a day

## 2017-09-06 NOTE — PROGRESS NOTE ADULT - PROBLEM SELECTOR PLAN 1
- Incidental finding of coronary sinus thrombosis; as per oncologist, he was not aware of any recent findings of thrombus on imaging.    - Discussed case with cardiology; will not initiate anticoagulation at this time given multiple comorbidities of active CA and ITP.    - Management discussed with pt, pt's wife and patient's outpatient oncologist

## 2017-09-06 NOTE — DISCHARGE NOTE ADULT - PLAN OF CARE
well managed We incidentally found that you have a coronary sinus thrombosis of unknown acuity/chronicity. As per oncologist, he was not aware of any recent findings of thrombus on imaging.  Discussed case with cardiology; will not initiate anticoagulation at this time given multiple comorbidities of active CA and ITP.  Please follow up with Dr. Juarez, the oncologist within 5 days and PMD, Dr. Swain. Call your doctor immediately if you developed any chest pain, fever or increased shortness of breath. As per cardiology; will not initiate anticoagulation at this time given multiple comorbidities of active CA and ITP. Please follow up with your PMD within one week. Please call your doctor immediately if you developed any chest pain, shortness of breath or palpitations. stable Please follow up with Dr. Juarez, the oncologist outpatient within one week. Promacta 75mg on hold because has prothrombotic potential of the medication. Please continue with Votrient as per oncologist, Dr. Juarez. Please follow up with oncologist within one week. well controlled Please continue your medications as prescribed, follow up with PMD within one week. We incidentally found that you have a coronary sinus thrombosis of unknown acuity/chronicity. As per oncologist, he was not aware of any recent findings of thrombus on imaging.  Discussed case with cardiology; will not initiate anticoagulation at this time given multiple comorbidities of active CA and ITP.  Please follow up with Dr. Juarez, the oncologist within 5 days and PMD Dr. Swain. Call your doctor immediately if you developed any chest pain, fever or increased shortness of breath. Please continue your medications as prescribed, follow up with PMD within one week. Monitor blood pressure at home prior to taking medications. Follow low sodium diet. Your HgA1C level is 7.1%. Please continue your medications Glipizide as prescribed, follow up with PMD within one week for further management.

## 2017-09-06 NOTE — PROGRESS NOTE ADULT - SUBJECTIVE AND OBJECTIVE BOX
Cardiology/Vascular Medicine Inpatient Progress Note      HISTORY OF PRESENT ILLNESS:  HPI:  85M hx RCC s/p R nephrectomy 4 years ago, recently found to have recurrence of cancer in RP lymph nodes and started on Votrient 4 weeks ago, HTN, DM2, BPH, ITP, early Parkinson's disease, RLE DVT and PE 1 year ago s/p embolectomy at [place] (now off Coumadin) presents with RLQ pain.  Pain started 3 days ago and described as sharp pains; he received Tylenol in ER and now pain has completely subsided.  On CT A/P, the patient was incidentally found to have a coronary sinus thrombosis for which the patient is being admitted for.      Pt is a poor historian and family was unable to provide much detail regarding his clot history.  The patient did have a procedure at [facility] 1 year ago to remove the clot, which was later confirmed with his outpt oncologist to be an embolectomy.  He is also s/p IVF filter placement and oncologist was unaware of a coronary sinus thrombus on recent imaging.  Pt was taken off of Coumadin by oncologist 2/2 ITP several months ago.  Currently, the patient denies SOB, chest pain, LE swelling, dizziness or any recent fainting episodes.  He describes feeling a "funny feeling" in his L upper chest 2 weeks ago, which went away on its own.  Denies any bruising or bleeding episodes, no epistaxis/gum bleeding, melena/hematochezia. (04 Sep 2017 18:59)    Reviewed CT ab/pel which covered his chest and heart as well.  Noted coronary sinus thrombosis as mentioned above.    Given extensive co-morbid conditions including +ITP with platelet count < 50, advanced age, Parkinson's, and prior treatment for VTE, the bleeding risks associated with anticoagulation at this point likely exceeds benefits.  Case d/w .      Vital Signs Last 24 Hrs  T(C): 37.1 (06 Sep 2017 13:50), Max: 37.2 (05 Sep 2017 21:21)  T(F): 98.8 (06 Sep 2017 13:50), Max: 99 (05 Sep 2017 22:33)  HR: 72 (06 Sep 2017 13:50) (66 - 72)  BP: 128/68 (06 Sep 2017 13:50) (128/68 - 142/72)  BP(mean): --  RR: 18 (06 Sep 2017 13:50) (17 - 18)  SpO2: 100% (06 Sep 2017 13:50) (100% - 100%)    Appearance: Normal	  HEENT:   Normal oral mucosa, PERRL, EOMI	  Lymphatic: No lymphadenopathy  Cardiovascular: Normal S1 S2, No JVD, No murmurs, No edema  Respiratory: As above  Psychiatry: Awake, alert  Gastrointestinal:  Soft, Non-tender, + BS	  Skin: No rashes, No ecchymoses, No cyanosis	  Neurologic: Non-focal  Extremities: Normal range of motion, No clubbing, cyanosis or edema  Vascular: Peripheral pulses palpable 2+ bilaterally    MEDICATIONS  (STANDING):  simvastatin 20 milliGRAM(s) Oral at bedtime  amLODIPine   Tablet 5 milliGRAM(s) Oral daily  hydrALAZINE 25 milliGRAM(s) Oral three times a day  insulin lispro (HumaLOG) corrective regimen sliding scale   SubCutaneous three times a day before meals  insulin lispro (HumaLOG) corrective regimen sliding scale   SubCutaneous at bedtime  dextrose 5%. 1000 milliLiter(s) (50 mL/Hr) IV Continuous <Continuous>  dextrose 50% Injectable 12.5 Gram(s) IV Push once  dextrose 50% Injectable 25 Gram(s) IV Push once  dextrose 50% Injectable 25 Gram(s) IV Push once  tamsulosin 0.4 milliGRAM(s) Oral at bedtime  carbidopa/levodopa  10/100 2 Tablet(s) Oral three times a day  enoxaparin Injectable 40 milliGRAM(s) SubCutaneous every 24 hours  aspirin enteric coated 81 milliGRAM(s) Oral daily  pazopanib 200 milliGRAM(s) Oral two times a day    MEDICATIONS  (PRN):  dextrose Gel 1 Dose(s) Oral once PRN Blood Glucose LESS THAN 70 milliGRAM(s)/deciliter  glucagon  Injectable 1 milliGRAM(s) IntraMuscular once PRN Glucose LESS THAN 70 milligrams/deciliter  acetaminophen   Tablet. 650 milliGRAM(s) Oral every 6 hours PRN Mild Pain (1 - 3)      LABS:	 	                          11.3   6.41  )-----------( 34       ( 06 Sep 2017 05:45 )             34.8     09-06    136  |  100  |  18  ----------------------------<  120<H>  4.5   |  21<L>  |  1.09    Ca    9.6      06 Sep 2017 05:45

## 2017-09-06 NOTE — DISCHARGE NOTE ADULT - SECONDARY DIAGNOSIS.
DVT (deep venous thrombosis) Idiopathic thrombocytopenia Metastatic cancer Essential hypertension Type 2 diabetes mellitus without complication, without long-term current use of insulin

## 2017-09-06 NOTE — DISCHARGE NOTE ADULT - MEDICATION SUMMARY - MEDICATIONS TO TAKE
I will START or STAY ON the medications listed below when I get home from the hospital:    Tylenol 325 mg oral tablet  -- 2 tab(s) by mouth every 4 hours, As Needed  -- Indication: For Pain / fever    aspirin 81 mg oral delayed release tablet  -- 1 tab(s) by mouth once a day  -- Indication: For Acute embolism and thrombosis of deep vein of lower extremity    tamsulosin 0.4 mg oral capsule  -- 1 cap(s) by mouth once a day  -- Indication: For Benign prostatic hyperplasia, presence of lower urinary tract symptoms unspecified    glipiZIDE 10 mg oral tablet, extended release  -- 1 tab(s) by mouth 2 times a day  -- Indication: For DM (diabetes mellitus)    simvastatin 20 mg oral tablet  -- 1 tab(s) by mouth once a day (at bedtime)  -- Indication: For HLD    megestrol 625 mg/5 mL oral suspension  -- 5 milliliter(s) by mouth once a day  -- Indication: For Poor appetite    carbidopa-levodopa 10 mg-100 mg oral tablet, disintegrating  -- 2 tab(s) by mouth 3 times a day  -- Indication: For Parkinson disease    amLODIPine 5 mg oral tablet  -- 1 tab(s) by mouth once a day  -- Indication: For Essential hypertension    hydrALAZINE 25 mg oral tablet  -- 1 tab(s) by mouth 3 times a day  -- Indication: For Essential hypertension    PAZOPanib 200 mg oral tablet  -- 1 tab(s) by mouth 2 times a day  -- Indication: For Metastatic cancer

## 2017-09-06 NOTE — PROGRESS NOTE ADULT - PROBLEM SELECTOR PLAN 8
- DVT ppx with Lovenox  - Pt is medically stable for d/c home today.  Case discussed extensively with pt, pt's wife and outpatient oncologist.  - D/C time 35 minutes
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
## 2020-08-06 NOTE — ED PROVIDER NOTE - CROS ED RESP ALL NEG
- - - Itraconazole Counseling:  I discussed with the patient the risks of itraconazole including but not limited to liver damage, nausea/vomiting, neuropathy, and severe allergy.  The patient understands that this medication is best absorbed when taken with acidic beverages such as non-diet cola or ginger ale.  The patient understands that monitoring is required including baseline LFTs and repeat LFTs at intervals.  The patient understands that they are to contact us or the primary physician if concerning signs are noted.

## 2021-07-09 NOTE — CONSULT NOTE ADULT - PROBLEM SELECTOR PROBLEM 3
Metastatic cancer Slit Excision Additional Text (Leave Blank If You Do Not Want): A linear line was drawn on the skin overlying the lesion. An incision was made slowly until the lesion was visualized.  Once visualized, the lesion was removed with blunt dissection.
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
